# Patient Record
Sex: FEMALE | Race: WHITE | NOT HISPANIC OR LATINO | URBAN - METROPOLITAN AREA
[De-identification: names, ages, dates, MRNs, and addresses within clinical notes are randomized per-mention and may not be internally consistent; named-entity substitution may affect disease eponyms.]

---

## 2017-04-30 ENCOUNTER — EMERGENCY (EMERGENCY)
Facility: HOSPITAL | Age: 77
LOS: 1 days | Discharge: HOME | End: 2017-04-30
Attending: EMERGENCY MEDICINE | Admitting: EMERGENCY MEDICINE
Payer: MEDICARE

## 2017-04-30 VITALS
DIASTOLIC BLOOD PRESSURE: 83 MMHG | HEART RATE: 89 BPM | OXYGEN SATURATION: 96 % | RESPIRATION RATE: 18 BRPM | SYSTOLIC BLOOD PRESSURE: 150 MMHG | TEMPERATURE: 99 F

## 2017-04-30 VITALS
OXYGEN SATURATION: 96 % | DIASTOLIC BLOOD PRESSURE: 84 MMHG | HEART RATE: 77 BPM | RESPIRATION RATE: 18 BRPM | SYSTOLIC BLOOD PRESSURE: 140 MMHG | TEMPERATURE: 99 F

## 2017-04-30 DIAGNOSIS — Z79.1 LONG TERM (CURRENT) USE OF NON-STEROIDAL ANTI-INFLAMMATORIES (NSAID): ICD-10-CM

## 2017-04-30 DIAGNOSIS — Z88.6 ALLERGY STATUS TO ANALGESIC AGENT: ICD-10-CM

## 2017-04-30 DIAGNOSIS — Z88.5 ALLERGY STATUS TO NARCOTIC AGENT: ICD-10-CM

## 2017-04-30 DIAGNOSIS — M79.89 OTHER SPECIFIED SOFT TISSUE DISORDERS: ICD-10-CM

## 2017-04-30 DIAGNOSIS — Z88.0 ALLERGY STATUS TO PENICILLIN: ICD-10-CM

## 2017-04-30 DIAGNOSIS — Z79.01 LONG TERM (CURRENT) USE OF ANTICOAGULANTS: ICD-10-CM

## 2017-04-30 PROCEDURE — 93971 EXTREMITY STUDY: CPT

## 2017-04-30 PROCEDURE — 85730 THROMBOPLASTIN TIME PARTIAL: CPT

## 2017-04-30 PROCEDURE — 99284 EMERGENCY DEPT VISIT MOD MDM: CPT | Mod: 25

## 2017-04-30 PROCEDURE — 85610 PROTHROMBIN TIME: CPT

## 2017-04-30 PROCEDURE — 85025 COMPLETE CBC W/AUTO DIFF WBC: CPT

## 2017-04-30 PROCEDURE — 93971 EXTREMITY STUDY: CPT | Mod: 26,RT

## 2017-04-30 PROCEDURE — 80053 COMPREHEN METABOLIC PANEL: CPT

## 2017-04-30 PROCEDURE — 36415 COLL VENOUS BLD VENIPUNCTURE: CPT

## 2017-04-30 NOTE — ED ADULT TRIAGE NOTE - CHIEF COMPLAINT QUOTE
pt has Right lower extremity swelling for a while ( over a year ) but since Friday night swelling increased, extremity warm to touch , pedal pulses are present, flaky rash noted also on Right lower extremity

## 2017-04-30 NOTE — ED PROVIDER NOTE - MEDICAL DECISION MAKING DETAILS
pt with b/l leg swelling right > left x several days - similar hx in past, pe - +3 edema to right lower leg, no acute infection at this time, bedside us ? chronic dvt - sent for rads study and negative - recommend vascular follow up for ? venous insufficiency -

## 2017-04-30 NOTE — ED PROVIDER NOTE - OBJECTIVE STATEMENT
78 yo f presents to ED with right leg swelling.  Pt with hx of similar symptoms in past ? chronic dvt.  Pt currently not on anticoagulation.  Denies cp or shortness of breath.  No fever or chills.

## 2017-04-30 NOTE — ED PROVIDER NOTE - MUSCULOSKELETAL, MLM
right lower ext - +3 edema with erythema and chronic skin changes to leg, no distal nv deficit, no warmth, left lower ext - +2 edema

## 2017-04-30 NOTE — ED ADULT NURSE NOTE - OBJECTIVE STATEMENT
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 Pt verbalized that her leg swelling issues has been persistent for years but noticed the increased in size yesterday. Pt verbalized that when she was last evaluated, she was told that she may have a "blood-clot". Upon assessment, bilateral leg swelling noted with rash and redness noted to right lower leg, abdomen soft, lung fields WNL, breathing is equal and unlabored, pulses palpable. Care in progress. Awaiting disposition

## 2017-07-12 PROBLEM — Z00.00 ENCOUNTER FOR PREVENTIVE HEALTH EXAMINATION: Status: ACTIVE | Noted: 2017-07-12

## 2017-07-13 ENCOUNTER — APPOINTMENT (OUTPATIENT)
Dept: HEART AND VASCULAR | Facility: CLINIC | Age: 77
End: 2017-07-13
Payer: MEDICARE

## 2017-07-13 VITALS — HEART RATE: 86 BPM | DIASTOLIC BLOOD PRESSURE: 80 MMHG | SYSTOLIC BLOOD PRESSURE: 130 MMHG

## 2017-07-13 VITALS — WEIGHT: 130 LBS | HEART RATE: 86 BPM | BODY MASS INDEX: 21.66 KG/M2 | HEIGHT: 65 IN

## 2017-07-13 DIAGNOSIS — R06.02 SHORTNESS OF BREATH: ICD-10-CM

## 2017-07-13 DIAGNOSIS — R53.83 OTHER MALAISE: ICD-10-CM

## 2017-07-13 DIAGNOSIS — R53.81 OTHER MALAISE: ICD-10-CM

## 2017-07-13 DIAGNOSIS — M32.9 SYSTEMIC LUPUS ERYTHEMATOSUS, UNSPECIFIED: ICD-10-CM

## 2017-07-13 DIAGNOSIS — Z87.39 PERSONAL HISTORY OF OTHER DISEASES OF THE MUSCULOSKELETAL SYSTEM AND CONNECTIVE TISSUE: ICD-10-CM

## 2017-07-13 DIAGNOSIS — R00.2 PALPITATIONS: ICD-10-CM

## 2017-07-13 DIAGNOSIS — N82.3 FISTULA OF VAGINA TO LARGE INTESTINE: ICD-10-CM

## 2017-07-13 DIAGNOSIS — E11.9 TYPE 2 DIABETES MELLITUS W/OUT COMPLICATIONS: ICD-10-CM

## 2017-07-13 DIAGNOSIS — R42 DIZZINESS AND GIDDINESS: ICD-10-CM

## 2017-07-13 DIAGNOSIS — K44.9 DIAPHRAGMATIC HERNIA W/OUT OBSTRUCTION OR GANGRENE: ICD-10-CM

## 2017-07-13 DIAGNOSIS — M35.00 SYSTEMIC LUPUS ERYTHEMATOSUS, UNSPECIFIED: ICD-10-CM

## 2017-07-13 DIAGNOSIS — H33.319 HORSESHOE TEAR OF RETINA W/OUT DETACHMENT, UNSPECIFIED EYE: ICD-10-CM

## 2017-07-13 DIAGNOSIS — K22.70 BARRETT'S ESOPHAGUS W/OUT DYSPLASIA: ICD-10-CM

## 2017-07-13 PROCEDURE — 93000 ELECTROCARDIOGRAM COMPLETE: CPT

## 2017-07-13 PROCEDURE — 99204 OFFICE O/P NEW MOD 45 MIN: CPT | Mod: 25

## 2017-07-13 PROCEDURE — 93306 TTE W/DOPPLER COMPLETE: CPT

## 2017-07-13 PROCEDURE — 36415 COLL VENOUS BLD VENIPUNCTURE: CPT

## 2017-07-14 LAB
25(OH)D3 SERPL-MCNC: 49.9 NG/ML
ALBUMIN SERPL ELPH-MCNC: 4.4 G/DL
ALP BLD-CCNC: 74 U/L
ALT SERPL-CCNC: 13 U/L
ANION GAP SERPL CALC-SCNC: 17 MMOL/L
AST SERPL-CCNC: 14 U/L
BASOPHILS # BLD AUTO: 0.02 K/UL
BASOPHILS NFR BLD AUTO: 0.3 %
BILIRUB SERPL-MCNC: 0.8 MG/DL
BUN SERPL-MCNC: 28 MG/DL
CALCIUM SERPL-MCNC: 9.9 MG/DL
CHLORIDE SERPL-SCNC: 104 MMOL/L
CHOLEST SERPL-MCNC: 186 MG/DL
CHOLEST/HDLC SERPL: 3 RATIO
CO2 SERPL-SCNC: 23 MMOL/L
CREAT SERPL-MCNC: 1.06 MG/DL
EOSINOPHIL # BLD AUTO: 0.04 K/UL
EOSINOPHIL NFR BLD AUTO: 0.5 %
GLUCOSE SERPL-MCNC: 103 MG/DL
HBA1C MFR BLD HPLC: 5.6 %
HCT VFR BLD CALC: 44.1 %
HDLC SERPL-MCNC: 63 MG/DL
HGB BLD-MCNC: 13.9 G/DL
IMM GRANULOCYTES NFR BLD AUTO: 0.3 %
LDLC SERPL CALC-MCNC: 112 MG/DL
LYMPHOCYTES # BLD AUTO: 1.33 K/UL
LYMPHOCYTES NFR BLD AUTO: 17.5 %
MAN DIFF?: NORMAL
MCHC RBC-ENTMCNC: 29.7 PG
MCHC RBC-ENTMCNC: 31.5 GM/DL
MCV RBC AUTO: 94.2 FL
MONOCYTES # BLD AUTO: 0.47 K/UL
MONOCYTES NFR BLD AUTO: 6.2 %
NEUTROPHILS # BLD AUTO: 5.7 K/UL
NEUTROPHILS NFR BLD AUTO: 75.2 %
PLATELET # BLD AUTO: 219 K/UL
POTASSIUM SERPL-SCNC: 4.4 MMOL/L
PROT SERPL-MCNC: 7.8 G/DL
RBC # BLD: 4.68 M/UL
RBC # FLD: 14.2 %
SODIUM SERPL-SCNC: 144 MMOL/L
T3FREE SERPL-MCNC: 2.59 PG/ML
T4 FREE SERPL-MCNC: 1.4 NG/DL
TRIGL SERPL-MCNC: 55 MG/DL
TSH SERPL-ACNC: 0.65 UIU/ML
WBC # FLD AUTO: 7.58 K/UL

## 2017-07-17 ENCOUNTER — RESULT REVIEW (OUTPATIENT)
Age: 77
End: 2017-07-17

## 2017-07-24 LAB — MAGNESIUM RBC-MCNC: 5.7 MG/DL

## 2017-08-02 ENCOUNTER — APPOINTMENT (OUTPATIENT)
Dept: INTERNAL MEDICINE | Facility: CLINIC | Age: 77
End: 2017-08-02

## 2017-12-27 ENCOUNTER — EMERGENCY (EMERGENCY)
Facility: HOSPITAL | Age: 77
LOS: 1 days | Discharge: HOME | End: 2017-12-27
Attending: EMERGENCY MEDICINE | Admitting: EMERGENCY MEDICINE
Payer: MEDICARE

## 2017-12-27 VITALS
TEMPERATURE: 97 F | HEART RATE: 73 BPM | OXYGEN SATURATION: 94 % | RESPIRATION RATE: 18 BRPM | SYSTOLIC BLOOD PRESSURE: 144 MMHG | DIASTOLIC BLOOD PRESSURE: 83 MMHG

## 2017-12-27 VITALS
SYSTOLIC BLOOD PRESSURE: 150 MMHG | OXYGEN SATURATION: 95 % | RESPIRATION RATE: 18 BRPM | DIASTOLIC BLOOD PRESSURE: 77 MMHG | HEART RATE: 87 BPM | TEMPERATURE: 98 F

## 2017-12-27 DIAGNOSIS — Z88.0 ALLERGY STATUS TO PENICILLIN: ICD-10-CM

## 2017-12-27 DIAGNOSIS — Z79.899 OTHER LONG TERM (CURRENT) DRUG THERAPY: ICD-10-CM

## 2017-12-27 DIAGNOSIS — Z88.8 ALLERGY STATUS TO OTHER DRUGS, MEDICAMENTS AND BIOLOGICAL SUBSTANCES STATUS: ICD-10-CM

## 2017-12-27 DIAGNOSIS — R09.81 NASAL CONGESTION: ICD-10-CM

## 2017-12-27 DIAGNOSIS — Z88.5 ALLERGY STATUS TO NARCOTIC AGENT: ICD-10-CM

## 2017-12-27 DIAGNOSIS — R06.02 SHORTNESS OF BREATH: ICD-10-CM

## 2017-12-27 DIAGNOSIS — F41.9 ANXIETY DISORDER, UNSPECIFIED: ICD-10-CM

## 2017-12-27 PROCEDURE — 99283 EMERGENCY DEPT VISIT LOW MDM: CPT

## 2017-12-27 RX ORDER — BENZTROPINE MESYLATE 1 MG
0 TABLET ORAL
Qty: 0 | Refills: 0 | COMMUNITY

## 2017-12-27 RX ORDER — OXYMETAZOLINE HYDROCHLORIDE 0.5 MG/ML
2 SPRAY NASAL
Qty: 1 | Refills: 0 | OUTPATIENT
Start: 2017-12-27 | End: 2017-12-29

## 2017-12-27 RX ORDER — FAMOTIDINE 10 MG/ML
0 INJECTION INTRAVENOUS
Qty: 0 | Refills: 0 | COMMUNITY

## 2017-12-27 RX ORDER — MIRTAZAPINE 45 MG/1
0 TABLET, ORALLY DISINTEGRATING ORAL
Qty: 0 | Refills: 0 | COMMUNITY

## 2017-12-27 RX ORDER — DOCUSATE SODIUM 100 MG
0 CAPSULE ORAL
Qty: 0 | Refills: 0 | COMMUNITY

## 2017-12-27 NOTE — ED ADULT NURSE NOTE - CHIEF COMPLAINT QUOTE
I am having difficulty breathing.  At first is was my nose, but now it's also my mouth. ( Son states has been aditted for behavioral health at [facility] for several months and that she has fequent complaints of medical problems ranging from inability to walk to inability to breath and every time it is negative.  He believes this to be another episode of similar behavior)>

## 2017-12-27 NOTE — ED ADULT TRIAGE NOTE - CHIEF COMPLAINT QUOTE
I am having difficulty breathing.  At first is was my nose, but now it's also my mouth. ( Son states has been aditted for behavioral health at [facility] for several months and that she has fequent complaints of medical problems ranging from inability to walk to inability to breath and every time it is negative.  He believes this to be another episode of similar behavior)>

## 2017-12-27 NOTE — ED ADULT NURSE NOTE - OBJECTIVE STATEMENT
Pt presents with c/o nasal congestion, dry mouth; denied CP, SOB; son at bedside stated that pt has had multiple previous presentations of similar nature, where mostly complaints were found to be of psychiatric nature. Otherwise pt appears without distress stating that her nose is 'stuffed up' and that she is unable to breath through it.

## 2017-12-27 NOTE — ED PROVIDER NOTE - OBJECTIVE STATEMENT
78yo F hx of severe anxiety worked up in [place] ed many times, and outpatient doctors and specialists (hx as per son), known to overuse ED, here today w/ concern for nasal congestion and resultant sensation of shortness of breath. Pt states cannot breathe through her nose, must breathe through mouth, and feels like she cannot get a good breath as a result. called her son and asked him to take her to the ED. Denies fevers/chills, CP, YOUNG, trouble ambulating. Speaking in full sentences without issue. Son denies any acute worsening of her anxiety - this happens every few days, sees a psychiatrist as well, but does not relax unless assessed by MD. Pt denies any other symptoms.

## 2017-12-27 NOTE — ED PROVIDER NOTE - MEDICAL DECISION MAKING DETAILS
here w/ concern for SOB 2/2 nasal congestion. here w/ concern for SOB 2/2 nasal congestion. no other symptoms. plan for nasal saline and irrigation. DC home in NAD with strict return precautions given with son.

## 2018-10-05 ENCOUNTER — HOSPITAL ENCOUNTER (EMERGENCY)
Dept: HOSPITAL 74 - JER | Age: 78
LOS: 1 days | Discharge: HOME | End: 2018-10-06
Payer: COMMERCIAL

## 2018-10-05 VITALS — BODY MASS INDEX: 23.9 KG/M2

## 2018-10-05 DIAGNOSIS — Z91.14: ICD-10-CM

## 2018-10-05 DIAGNOSIS — I87.8: ICD-10-CM

## 2018-10-05 DIAGNOSIS — L97.811: ICD-10-CM

## 2018-10-05 DIAGNOSIS — K22.70: ICD-10-CM

## 2018-10-05 DIAGNOSIS — R42: Primary | ICD-10-CM

## 2018-10-05 DIAGNOSIS — K44.9: ICD-10-CM

## 2018-10-05 DIAGNOSIS — G20: ICD-10-CM

## 2018-10-05 DIAGNOSIS — K21.9: ICD-10-CM

## 2018-10-05 DIAGNOSIS — L97.821: ICD-10-CM

## 2018-10-05 LAB
ALBUMIN SERPL-MCNC: 3.5 G/DL (ref 3.4–5)
ALP SERPL-CCNC: 111 U/L (ref 45–117)
ALT SERPL-CCNC: 20 U/L (ref 13–61)
ANION GAP SERPL CALC-SCNC: 6 MMOL/L (ref 8–16)
AST SERPL-CCNC: 17 U/L (ref 15–37)
BASOPHILS # BLD: 0.6 % (ref 0–2)
BILIRUB SERPL-MCNC: 0.5 MG/DL (ref 0.2–1)
BUN SERPL-MCNC: 33 MG/DL (ref 7–18)
CALCIUM SERPL-MCNC: 9.1 MG/DL (ref 8.5–10.1)
CHLORIDE SERPL-SCNC: 109 MMOL/L (ref 98–107)
CO2 SERPL-SCNC: 28 MMOL/L (ref 21–32)
CREAT SERPL-MCNC: 1.1 MG/DL (ref 0.55–1.3)
DEPRECATED RDW RBC AUTO: 13.7 % (ref 11.6–15.6)
EOSINOPHIL # BLD: 3 % (ref 0–4.5)
GLUCOSE SERPL-MCNC: 97 MG/DL (ref 74–106)
HCT VFR BLD CALC: 42.4 % (ref 32.4–45.2)
HGB BLD-MCNC: 14 GM/DL (ref 10.7–15.3)
LYMPHOCYTES # BLD: 26.3 % (ref 8–40)
MCH RBC QN AUTO: 30 PG (ref 25.7–33.7)
MCHC RBC AUTO-ENTMCNC: 33.1 G/DL (ref 32–36)
MCV RBC: 90.6 FL (ref 80–96)
MONOCYTES # BLD AUTO: 8.7 % (ref 3.8–10.2)
NEUTROPHILS # BLD: 61.4 % (ref 42.8–82.8)
PLATELET # BLD AUTO: 227 K/MM3 (ref 134–434)
PMV BLD: 9.1 FL (ref 7.5–11.1)
POTASSIUM SERPLBLD-SCNC: 4.8 MMOL/L (ref 3.5–5.1)
PROT SERPL-MCNC: 7.6 G/DL (ref 6.4–8.2)
RBC # BLD AUTO: 4.67 M/MM3 (ref 3.6–5.2)
SODIUM SERPL-SCNC: 144 MMOL/L (ref 136–145)
WBC # BLD AUTO: 9.4 K/MM3 (ref 4–10)

## 2018-10-05 NOTE — PDOC
Attending Attestation





- HPI


HPI: 





10/05/18 21:07


The patient is a 78-year-old with past medical history significant for COPD, 

GERD, and Nunes's esophagus and hiatal hernia presents to the emergency 

department with vertigo. The patient denies any acute complaint. The patient 

reports having episodes of vertigo reports bring non-compliant with her 

Antivert medication today. The patient states she is nonambulatory secondary to 

vertigo. Denies hx of a blood clot or the use of blood thinners. Denies chest 

pain. Patient endorses abdominal pain, reports hx of Nunes's and hiatal 

hernia.


Allergies: NKA


PCP: None





- Physicial Exam


PE: 





10/05/18 23:49


PE: The patient is afebrile, answering all questions, alert and oriented. 


Extremity: B/l lower leg warmth with redness. 








- Medical Decision Making





10/05/18 21:07





Documentation prepared by Bina Valencia, acting as medical scribe for Tabitha Hollins MD. 





<Bina Valencia - Last Filed: 10/05/18 23:49>





- Resident


Resident Name: Estevan Terrell





- ED Attending Attestation


I have performed the following: I have examined & evaluated the patient, The 

case was reviewed & discussed with the resident, I agree w/resident's findings 

& plan





- HPI


HPI: 








10/06/18 06:01


Pt comes with multiple complaints.  Her exam is normal however, and she is 

walking about the ER socializing with all the patients.





- Physicial Exam


PE: 








10/06/18 06:01


Agree with resident exam.


Pt has bilateral stasis changes on her lower legs. 


Pt is unkempt; she is afraid to bathe because she has chronic skin changes and 

she doesn't want to have her skin break down further.





- Medical Decision Making








10/06/18 06:02


Labs normal; exam normal and pt will be sent home. However, she states that she 

doesn't have the outer apartment key to the building. States that she needs to 

go home at 9AM.





<Tabitha Hollins - Last Filed: 10/06/18 06:05>

## 2018-10-06 VITALS — DIASTOLIC BLOOD PRESSURE: 67 MMHG | TEMPERATURE: 99 F | SYSTOLIC BLOOD PRESSURE: 126 MMHG | HEART RATE: 69 BPM

## 2018-10-06 NOTE — PDOC
History of Present Illness





- General


Chief Complaint: Lightheaded


Stated Complaint: SICK


Time Seen by Provider: 10/05/18 19:06


History Source: Patient


Exam Limitations: No Limitations





- History of Present Illness


Initial Comments: 





77 y/o female presenting to [facility] ER via ambulance complaining of worsening of 

her usual vertigo symptoms and pain on the medial aspect of the right lower 

extremity. Pt states her vertigo is worse because she did not take her antivert 

medication today because she didnt feel like it at the time. She further 

states that her leg pain is chronic and unchanged. Pt was instructed to present 

here by her . She states that her complaints are all chronic and 

she does not think she requires emergency evaluation. 





Pt denies personal or familial history of blood clots.  








PCP: [facility]





Medical Hx: 


- Vertigo


- Parkinsons


- Nunes's Esophagus, EGD reportedly last year


- COPD











Past History





- Past Medical History


Allergies/Adverse Reactions: 


 Allergies











Allergy/AdvReac Type Severity Reaction Status Date / Time


 


No Known Allergies Allergy   Unverified 10/05/18 17:29











Home Medications: 


Ambulatory Orders





Aspirin Coated [Ecotrin -] 81 mg PO DAILY #30 tablet.ec 07/11/18 


Carbidopa/Levodopa 10/100 [Sinemet 10/100 -] 1 each PO BID #60 tablet 07/11/18 


Meclizine HCl [Antivert -] 25 mg PO TID PRN #90 tablet 07/11/18 








Cancer: Yes (Basal cell carcinoma left ear)


COPD: Yes





- Surgical History


Cholecystectomy: Yes





- Suicide/Smoking/Psychosocial Hx


Smoking History: Never smoked


Hx Alcohol Use: No


Drug/Substance Use Hx: No


Substance Use Type: None





**Review of Systems





- Review of Systems


Able to Perform ROS?: Yes


Comments:: 





In addition to that documented in the HPI above, the additional ROS was obtained

:


Constitutional: Denies fevers or chills


Eyes: Denies vision changes


ENMT: Denies sore throat


CV: Denies chest pain


Resp: Denies SOB


GI: Denies vomiting or diarrhea











*Physical Exam





- Vital Signs


 Last Vital Signs











Temp Pulse Resp BP Pulse Ox


 


 98.4 F   82   18   104/71   96 


 


 10/05/18 17:30  10/05/18 17:30  10/05/18 17:30  10/05/18 17:30  10/05/18 17:30














- Physical Exam


Comments: 





Constitutional: Well-developed, well-nourished female in no acute distress or 

obvious discomfort. Found left lateral recumbent position in hospital bed. 

Alert and oriented x4. Answered all questions appropriately and completely. 

Speech was non-labored, non-pressured.    





HEENT: Normocephalic. No obvious external signs of trauma. Pupils 3mm and PERRL 

bilaterally. Hearing grossly normal. No nasal discharge. Neck is supple, 

trachea is midline.   


  


Cardiovascular: Regular rate and regular rhythm.  No murmur, rubs, clicks, or 

gallops. Peripheral pulses:  Radial pulses full.   


 


Respiratory: Breathing unlabored. Equal chest rise and fall. Clear to 

auscultation bilaterally. No stridor, no wheezing, no rhonchi. 


 


Gastrointestinal: abdomen is soft, non-tender, non-distended. 


 


Neuro: Alert and oriented. Moving all four extremities spontaneously. Gait 

normal, observed walking throughout the department unassisted. 


  


MSK / Skin: Warm and dry. Moderate tinea corporis on right and left feet 

without bleeding or skin breakdown. No subjective tenderness on palpation of 

medial aspect of right lower extremity. 


 


Psych: Affect: appropriate.  Mood: normal.











ED Treatment Course





- LABORATORY


CBC & Chemistry Diagram: 


 10/05/18 21:39





 10/05/18 21:39





- ADDITIONAL ORDERS


Additional order review: 


 Laboratory  Results











  10/05/18 10/05/18





  21:39 21:39


 


Sodium   144


 


Potassium   4.8


 


Chloride   109 H


 


Carbon Dioxide   28


 


Anion Gap   6 L


 


BUN   33 H


 


Creatinine   1.1


 


Creat Clearance w eGFR   47.91


 


Random Glucose   97


 


Calcium   9.1


 


Total Bilirubin   0.5


 


AST   17


 


ALT   20


 


Alkaline Phosphatase   111


 


Troponin I  < 0.02 


 


Total Protein   7.6


 


Albumin   3.5








 











  10/05/18





  21:39


 


RBC  4.67


 


MCV  90.6


 


MCHC  33.1


 


RDW  13.7


 


MPV  9.1


 


Neutrophils %  61.4


 


Lymphocytes %  26.3


 


Monocytes %  8.7


 


Eosinophils %  3.0


 


Basophils %  0.6














- RADIOLOGY


Radiology Studies Ordered: 














 Category Date Time Status


 


 DUPLEX ART. LEGS- LIMITED US [US] Stat Ultrasound  10/05/18 19:59 Completed


 


 DUPLEX VASCUL US-1 LEG [US] Stat Ultrasound  10/05/18 19:59 Completed














- Medications


Given in the ED: 


ED Medications














Discontinued Medications














Generic Name Dose Route Start Last Admin





  Trade Name Freq  PRN Reason Stop Dose Admin


 


Meclizine HCl  50 mg  10/05/18 19:40  10/05/18 21:00





  Antivert -  PO  10/05/18 19:41  50 mg





  ONCE ONE   Administration





     





     





     





     














Medical Decision Making





- Medical Decision Making





*Reviewed vital signs, nursing notes, and prior visit documentation (if 

available).





77 y/o female presenting with vertiginous symptoms after failing to take 

prescribed meclizine. Afebrile. Vitals unremarkable. Will obtain duplex 

arterial and venous u/s of right lower extremity to evaluate for thrombus. 

Suspect vertiginous symptoms are related to poor medication compliance. Will 

obtain CBC, CMP, troponin, and EKG. D/D: ACS, anemia, electrolyte abnormality. 

Ordered meclizine for symptom relief. 





CBC unremarkable for anemia or leukocytosis.





CMP unremarkable for electrolyte derangement. LFTs not elevated. 





Troponin not elevated. 





U/S do not show evidence of DVT. Prominently diminished flow at level of right 

posterior tibial artery. 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 Pt will 

need to have further outpatient workup. 





Discussed imaging and laboratory results with pt. Answered all questions. 

Provided return precautions. Pt expressed verbal understanding and agreement 

with plan to discharge home with outpatient follow up.





Pt unable to return to her apartment building [facility] because she forgot the 

electronic FOB required to enter the front door. Called [facility] for building who 

provided emergency contact numbers. Unable to make contact with building 

management at either number. Pts return ambulance rescheduled for 9AM when her 

building  service opens. 














*DC/Admit/Observation/Transfer


Diagnosis at time of Disposition: 


 Lightheadedness








- Discharge Dispostion


Disposition: HOME


Condition at time of disposition: Stable


Decision to Admit order: No





- Referrals


Referrals: 


[facility] [place] [Provider Group]





- Patient Instructions


Printed Discharge Instructions:  DI for Vertigo


Additional Instructions: 


your vertigo was likey caused by not taking your antivert. Please remember to 

take your antivert when you feel dizzy or lightheaded. 


Come back to the ER if you experience any new dizziness, lightheadedness, a 

high fever, vomiting, or any other new or worsening concerns. 





Thank you for coming to the [facility] ER. We hope you feel better soon! 


Print Language: ENGLISH





- Post Discharge Activity

## 2018-10-06 NOTE — EKG
Test Reason : 

Blood Pressure : ***/*** mmHG

Vent. Rate : 069 BPM     Atrial Rate : 069 BPM

   P-R Int : 162 ms          QRS Dur : 070 ms

    QT Int : 390 ms       P-R-T Axes : 048 013 050 degrees

   QTc Int : 417 ms

 

NORMAL SINUS RHYTHM

NORMAL ECG

WHEN COMPARED WITH ECG OF 06-JUL-2018 17:50,

NO SIGNIFICANT CHANGE WAS FOUND

Confirmed by ADRIEN CACERES MD ([id number]) on 10/6/2018 4:55:05 PM

 

Referred By:             Confirmed By:ADRIEN CACERES MD

## 2022-03-13 ENCOUNTER — HOSPITAL ENCOUNTER (EMERGENCY)
Dept: HOSPITAL 74 - JER | Age: 82
Discharge: HOME | End: 2022-03-13
Payer: COMMERCIAL

## 2022-03-13 VITALS — DIASTOLIC BLOOD PRESSURE: 68 MMHG | HEART RATE: 90 BPM | SYSTOLIC BLOOD PRESSURE: 119 MMHG | TEMPERATURE: 97.7 F

## 2022-03-13 VITALS — BODY MASS INDEX: 22.8 KG/M2

## 2022-03-13 DIAGNOSIS — L03.116: ICD-10-CM

## 2022-03-13 DIAGNOSIS — M25.562: Primary | ICD-10-CM

## 2022-03-13 DIAGNOSIS — N39.0: ICD-10-CM

## 2022-03-13 DIAGNOSIS — M79.641: ICD-10-CM

## 2022-03-13 LAB
ALBUMIN SERPL-MCNC: 3.2 G/DL (ref 3.4–5)
ALP SERPL-CCNC: 79 U/L (ref 45–117)
ALT SERPL-CCNC: 20 U/L (ref 13–61)
ANION GAP SERPL CALC-SCNC: 5 MMOL/L (ref 8–16)
APPEARANCE UR: CLEAR
AST SERPL-CCNC: 20 U/L (ref 15–37)
BACTERIA # UR AUTO: (no result) /UL (ref 0–1359)
BASOPHILS # BLD: 0.3 % (ref 0–2)
BILIRUB SERPL-MCNC: 0.4 MG/DL (ref 0.2–1)
BILIRUB UR STRIP.AUTO-MCNC: NEGATIVE MG/DL
BNP SERPL-MCNC: 118.4 PG/ML (ref 5–450)
BUN SERPL-MCNC: 28.9 MG/DL (ref 7–18)
CALCIUM SERPL-MCNC: 8.7 MG/DL (ref 8.5–10.1)
CASTS URNS QL MICRO: 0 /UL (ref 0–3.1)
CHLORIDE SERPL-SCNC: 109 MMOL/L (ref 98–107)
CO2 SERPL-SCNC: 31 MMOL/L (ref 21–32)
COLOR UR: YELLOW
CREAT SERPL-MCNC: 0.9 MG/DL (ref 0.55–1.3)
DEPRECATED RDW RBC AUTO: 13.9 % (ref 11.6–15.6)
EOSINOPHIL # BLD: 2.3 % (ref 0–4.5)
EPITH CASTS URNS QL MICRO: >36 /UL (ref 0–25.1)
GLUCOSE SERPL-MCNC: 86 MG/DL (ref 74–106)
HCT VFR BLD CALC: 37.1 % (ref 32.4–45.2)
HGB BLD-MCNC: 12.4 GM/DL (ref 10.7–15.3)
KETONES UR QL STRIP: (no result)
LEUKOCYTE ESTERASE UR QL STRIP.AUTO: (no result)
LYMPHOCYTES # BLD: 15.1 % (ref 8–40)
MAGNESIUM SERPL-MCNC: 2.2 MG/DL (ref 1.8–2.4)
MCH RBC QN AUTO: 30 PG (ref 25.7–33.7)
MCHC RBC AUTO-ENTMCNC: 33.4 G/DL (ref 32–36)
MCV RBC: 89.9 FL (ref 80–96)
MONOCYTES # BLD AUTO: 7.6 % (ref 3.8–10.2)
NEUTROPHILS # BLD: 74.7 % (ref 42.8–82.8)
NITRITE UR QL STRIP: POSITIVE
PH UR: 6 [PH] (ref 5–8)
PLATELET # BLD AUTO: 212 10^3/UL (ref 134–434)
PMV BLD: 7.7 FL (ref 7.5–11.1)
PROT SERPL-MCNC: 6.4 G/DL (ref 6.4–8.2)
PROT UR QL STRIP: NEGATIVE
PROT UR QL STRIP: NEGATIVE
RBC # BLD AUTO: 4.13 M/MM3 (ref 3.6–5.2)
RBC # BLD AUTO: 7 /UL (ref 0–23.9)
SODIUM SERPL-SCNC: 145 MMOL/L (ref 136–145)
SP GR UR: 1.02 (ref 1.01–1.03)
UROBILINOGEN UR STRIP-MCNC: 0.2 MG/DL (ref 0.2–1)
WBC # BLD AUTO: 7.3 K/MM3 (ref 4–10)
WBC # UR AUTO: 98 /UL (ref 0–25.8)

## 2022-03-13 PROCEDURE — 3E03329 INTRODUCTION OF OTHER ANTI-INFECTIVE INTO PERIPHERAL VEIN, PERCUTANEOUS APPROACH: ICD-10-PCS

## 2022-03-13 PROCEDURE — 3E0333Z INTRODUCTION OF ANTI-INFLAMMATORY INTO PERIPHERAL VEIN, PERCUTANEOUS APPROACH: ICD-10-PCS

## 2022-09-02 ENCOUNTER — HOSPITAL ENCOUNTER (INPATIENT)
Dept: HOSPITAL 74 - JER | Age: 82
LOS: 9 days | Discharge: HOME | DRG: 603 | End: 2022-09-11
Attending: INTERNAL MEDICINE | Admitting: INTERNAL MEDICINE
Payer: COMMERCIAL

## 2022-09-02 VITALS — BODY MASS INDEX: 23.2 KG/M2

## 2022-09-02 DIAGNOSIS — I25.10: ICD-10-CM

## 2022-09-02 DIAGNOSIS — L03.116: ICD-10-CM

## 2022-09-02 DIAGNOSIS — R07.89: ICD-10-CM

## 2022-09-02 DIAGNOSIS — I10: ICD-10-CM

## 2022-09-02 DIAGNOSIS — G20: ICD-10-CM

## 2022-09-02 DIAGNOSIS — I73.9: ICD-10-CM

## 2022-09-02 DIAGNOSIS — H40.9: ICD-10-CM

## 2022-09-02 DIAGNOSIS — K22.70: ICD-10-CM

## 2022-09-02 DIAGNOSIS — N39.0: ICD-10-CM

## 2022-09-02 DIAGNOSIS — L03.115: Primary | ICD-10-CM

## 2022-09-02 DIAGNOSIS — J44.9: ICD-10-CM

## 2022-09-02 DIAGNOSIS — R09.02: ICD-10-CM

## 2022-09-02 LAB
ALBUMIN SERPL-MCNC: 3.4 G/DL (ref 3.4–5)
ALP SERPL-CCNC: 73 U/L (ref 45–117)
ALT SERPL-CCNC: 16 U/L (ref 13–61)
ANION GAP SERPL CALC-SCNC: 6 MMOL/L (ref 8–16)
APPEARANCE UR: (no result)
AST SERPL-CCNC: 13 U/L (ref 15–37)
BACTERIA # UR AUTO: 3533 /UL (ref 0–1359)
BASOPHILS # BLD: 0.5 % (ref 0–2)
BILIRUB SERPL-MCNC: 1.2 MG/DL (ref 0.2–1)
BILIRUB UR STRIP.AUTO-MCNC: NEGATIVE MG/DL
BUN SERPL-MCNC: 23.6 MG/DL (ref 7–18)
CALCIUM SERPL-MCNC: 8.7 MG/DL (ref 8.5–10.1)
CASTS URNS QL MICRO: 1 /UL (ref 0–3.1)
CHLORIDE SERPL-SCNC: 106 MMOL/L (ref 98–107)
CO2 SERPL-SCNC: 28 MMOL/L (ref 21–32)
COLOR UR: YELLOW
CREAT SERPL-MCNC: 0.9 MG/DL (ref 0.55–1.3)
DEPRECATED RDW RBC AUTO: 14.4 % (ref 11.6–15.6)
EOSINOPHIL # BLD: 2.8 % (ref 0–4.5)
EPITH CASTS URNS QL MICRO: >36 /UL (ref 0–25.1)
GLUCOSE SERPL-MCNC: 100 MG/DL (ref 74–106)
HCT VFR BLD CALC: 41.1 % (ref 32.4–45.2)
HGB BLD-MCNC: 13.7 GM/DL (ref 10.7–15.3)
INR BLD: 1.07 (ref 0.83–1.09)
KETONES UR QL STRIP: (no result)
LEUKOCYTE ESTERASE UR QL STRIP.AUTO: (no result)
LYMPHOCYTES # BLD: 26.7 % (ref 8–40)
MCH RBC QN AUTO: 29.5 PG (ref 25.7–33.7)
MCHC RBC AUTO-ENTMCNC: 33.3 G/DL (ref 32–36)
MCV RBC: 88.6 FL (ref 80–96)
MONOCYTES # BLD AUTO: 8 % (ref 3.8–10.2)
NEUTROPHILS # BLD: 62 % (ref 42.8–82.8)
NITRITE UR QL STRIP: POSITIVE
PH UR: 6.5 [PH] (ref 5–8)
PLATELET # BLD AUTO: 220 10^3/UL (ref 134–434)
PMV BLD: 7.3 FL (ref 7.5–11.1)
PROT SERPL-MCNC: 7.2 G/DL (ref 6.4–8.2)
PROT UR QL STRIP: NEGATIVE
PROT UR QL STRIP: NEGATIVE
PT PNL PPP: 12.3 SEC (ref 9.7–13)
RBC # BLD AUTO: 17.8 /UL (ref 0–23.9)
RBC # BLD AUTO: 4.64 M/MM3 (ref 3.6–5.2)
SODIUM SERPL-SCNC: 140 MMOL/L (ref 136–145)
SP GR UR: 1.02 (ref 1.01–1.03)
UROBILINOGEN UR STRIP-MCNC: 0.2 MG/DL (ref 0.2–1)
WBC # BLD AUTO: 6.8 K/MM3 (ref 4–10)
WBC # UR AUTO: 57 /UL (ref 0–25.8)

## 2022-09-02 PROCEDURE — A9579 GAD-BASE MR CONTRAST NOS,1ML: HCPCS

## 2022-09-03 LAB
ALBUMIN SERPL-MCNC: 3.2 G/DL (ref 3.4–5)
ALP SERPL-CCNC: 73 U/L (ref 45–117)
ALT SERPL-CCNC: 16 U/L (ref 13–61)
ANION GAP SERPL CALC-SCNC: 5 MMOL/L (ref 8–16)
AST SERPL-CCNC: 20 U/L (ref 15–37)
BASOPHILS # BLD: 0.6 % (ref 0–2)
BILIRUB SERPL-MCNC: 1.6 MG/DL (ref 0.2–1)
BUN SERPL-MCNC: 19.6 MG/DL (ref 7–18)
CALCIUM SERPL-MCNC: 8.7 MG/DL (ref 8.5–10.1)
CHLORIDE SERPL-SCNC: 110 MMOL/L (ref 98–107)
CO2 SERPL-SCNC: 28 MMOL/L (ref 21–32)
CREAT SERPL-MCNC: 1 MG/DL (ref 0.55–1.3)
DEPRECATED RDW RBC AUTO: 14.2 % (ref 11.6–15.6)
EOSINOPHIL # BLD: 2.8 % (ref 0–4.5)
GLUCOSE SERPL-MCNC: 114 MG/DL (ref 74–106)
HCT VFR BLD CALC: 40.4 % (ref 32.4–45.2)
HGB BLD-MCNC: 13.5 GM/DL (ref 10.7–15.3)
LYMPHOCYTES # BLD: 18.5 % (ref 8–40)
MAGNESIUM SERPL-MCNC: 2.2 MG/DL (ref 1.8–2.4)
MCH RBC QN AUTO: 29.3 PG (ref 25.7–33.7)
MCHC RBC AUTO-ENTMCNC: 33.4 G/DL (ref 32–36)
MCV RBC: 87.8 FL (ref 80–96)
MONOCYTES # BLD AUTO: 8.4 % (ref 3.8–10.2)
NEUTROPHILS # BLD: 69.7 % (ref 42.8–82.8)
PHOSPHATE SERPL-MCNC: 2.9 MG/DL (ref 2.5–4.9)
PLATELET # BLD AUTO: 228 10^3/UL (ref 134–434)
PMV BLD: 7.8 FL (ref 7.5–11.1)
PROT SERPL-MCNC: 6.6 G/DL (ref 6.4–8.2)
RBC # BLD AUTO: 4.61 M/MM3 (ref 3.6–5.2)
SODIUM SERPL-SCNC: 144 MMOL/L (ref 136–145)
WBC # BLD AUTO: 6.9 K/MM3 (ref 4–10)

## 2022-09-03 RX ADMIN — ENOXAPARIN SODIUM SCH MG: 40 INJECTION SUBCUTANEOUS at 09:37

## 2022-09-03 RX ADMIN — SODIUM CHLORIDE SCH MLS/HR: 9 INJECTION, SOLUTION INTRAVENOUS at 21:14

## 2022-09-03 RX ADMIN — PIPERACILLIN SODIUM,TAZOBACTAM SODIUM SCH MLS/HR: 3; .375 INJECTION, POWDER, FOR SOLUTION INTRAVENOUS at 00:43

## 2022-09-03 RX ADMIN — CEFTRIAXONE SCH MLS/HR: 1 INJECTION, POWDER, FOR SOLUTION INTRAMUSCULAR; INTRAVENOUS at 11:32

## 2022-09-03 RX ADMIN — PIPERACILLIN SODIUM,TAZOBACTAM SODIUM SCH MLS/HR: 3; .375 INJECTION, POWDER, FOR SOLUTION INTRAVENOUS at 06:24

## 2022-09-03 RX ADMIN — SODIUM CHLORIDE SCH MLS/HR: 9 INJECTION, SOLUTION INTRAVENOUS at 11:00

## 2022-09-04 LAB
ALBUMIN SERPL-MCNC: 2.9 G/DL (ref 3.4–5)
ALP SERPL-CCNC: 65 U/L (ref 45–117)
ALT SERPL-CCNC: 14 U/L (ref 13–61)
ANION GAP SERPL CALC-SCNC: 8 MMOL/L (ref 8–16)
AST SERPL-CCNC: 13 U/L (ref 15–37)
BASOPHILS # BLD: 0.5 % (ref 0–2)
BILIRUB SERPL-MCNC: 0.7 MG/DL (ref 0.2–1)
BUN SERPL-MCNC: 14.6 MG/DL (ref 7–18)
CALCIUM SERPL-MCNC: 8.3 MG/DL (ref 8.5–10.1)
CHLORIDE SERPL-SCNC: 111 MMOL/L (ref 98–107)
CO2 SERPL-SCNC: 26 MMOL/L (ref 21–32)
CREAT SERPL-MCNC: 0.8 MG/DL (ref 0.55–1.3)
DEPRECATED RDW RBC AUTO: 14.2 % (ref 11.6–15.6)
EOSINOPHIL # BLD: 2.1 % (ref 0–4.5)
GLUCOSE SERPL-MCNC: 101 MG/DL (ref 74–106)
HCT VFR BLD CALC: 38.3 % (ref 32.4–45.2)
HGB BLD-MCNC: 12.6 GM/DL (ref 10.7–15.3)
LYMPHOCYTES # BLD: 17.4 % (ref 8–40)
MCH RBC QN AUTO: 29 PG (ref 25.7–33.7)
MCHC RBC AUTO-ENTMCNC: 32.9 G/DL (ref 32–36)
MCV RBC: 88.2 FL (ref 80–96)
MONOCYTES # BLD AUTO: 7.9 % (ref 3.8–10.2)
NEUTROPHILS # BLD: 72.1 % (ref 42.8–82.8)
PLATELET # BLD AUTO: 218 10^3/UL (ref 134–434)
PMV BLD: 7.9 FL (ref 7.5–11.1)
PROT SERPL-MCNC: 6.2 G/DL (ref 6.4–8.2)
RBC # BLD AUTO: 4.35 M/MM3 (ref 3.6–5.2)
SODIUM SERPL-SCNC: 144 MMOL/L (ref 136–145)
WBC # BLD AUTO: 6.8 K/MM3 (ref 4–10)

## 2022-09-04 RX ADMIN — CEFTRIAXONE SCH MLS/HR: 1 INJECTION, POWDER, FOR SOLUTION INTRAMUSCULAR; INTRAVENOUS at 10:15

## 2022-09-04 RX ADMIN — ENOXAPARIN SODIUM SCH MG: 40 INJECTION SUBCUTANEOUS at 10:15

## 2022-09-05 LAB
ALBUMIN SERPL-MCNC: 3 G/DL (ref 3.4–5)
ALP SERPL-CCNC: 63 U/L (ref 45–117)
ALT SERPL-CCNC: 14 U/L (ref 13–61)
ANION GAP SERPL CALC-SCNC: 8 MMOL/L (ref 8–16)
AST SERPL-CCNC: 16 U/L (ref 15–37)
BASOPHILS # BLD: 0.5 % (ref 0–2)
BILIRUB SERPL-MCNC: 0.8 MG/DL (ref 0.2–1)
BUN SERPL-MCNC: 16.1 MG/DL (ref 7–18)
CALCIUM SERPL-MCNC: 9.1 MG/DL (ref 8.5–10.1)
CHLORIDE SERPL-SCNC: 112 MMOL/L (ref 98–107)
CO2 SERPL-SCNC: 25 MMOL/L (ref 21–32)
CREAT SERPL-MCNC: 0.8 MG/DL (ref 0.55–1.3)
DEPRECATED RDW RBC AUTO: 14.5 % (ref 11.6–15.6)
EOSINOPHIL # BLD: 3.6 % (ref 0–4.5)
GLUCOSE SERPL-MCNC: 91 MG/DL (ref 74–106)
HCT VFR BLD CALC: 37.8 % (ref 32.4–45.2)
HGB BLD-MCNC: 12.3 GM/DL (ref 10.7–15.3)
LYMPHOCYTES # BLD: 25 % (ref 8–40)
MCH RBC QN AUTO: 28.9 PG (ref 25.7–33.7)
MCHC RBC AUTO-ENTMCNC: 32.6 G/DL (ref 32–36)
MCV RBC: 88.6 FL (ref 80–96)
MONOCYTES # BLD AUTO: 9.3 % (ref 3.8–10.2)
NEUTROPHILS # BLD: 61.6 % (ref 42.8–82.8)
PLATELET # BLD AUTO: 207 10^3/UL (ref 134–434)
PMV BLD: 7.9 FL (ref 7.5–11.1)
PROT SERPL-MCNC: 6.2 G/DL (ref 6.4–8.2)
RBC # BLD AUTO: 4.27 M/MM3 (ref 3.6–5.2)
SODIUM SERPL-SCNC: 145 MMOL/L (ref 136–145)
WBC # BLD AUTO: 6.7 K/MM3 (ref 4–10)

## 2022-09-05 RX ADMIN — PIPERACILLIN SODIUM,TAZOBACTAM SODIUM SCH MLS/HR: 3; .375 INJECTION, POWDER, FOR SOLUTION INTRAVENOUS at 12:47

## 2022-09-05 RX ADMIN — ENOXAPARIN SODIUM SCH MG: 40 INJECTION SUBCUTANEOUS at 10:26

## 2022-09-05 RX ADMIN — CEFTRIAXONE SCH MLS/HR: 1 INJECTION, POWDER, FOR SOLUTION INTRAMUSCULAR; INTRAVENOUS at 10:17

## 2022-09-05 RX ADMIN — PIPERACILLIN SODIUM,TAZOBACTAM SODIUM SCH MLS/HR: 3; .375 INJECTION, POWDER, FOR SOLUTION INTRAVENOUS at 17:11

## 2022-09-05 RX ADMIN — HYDROCHLOROTHIAZIDE SCH: 25 TABLET ORAL at 13:59

## 2022-09-06 LAB
ALBUMIN SERPL-MCNC: 3.3 G/DL (ref 3.4–5)
ALP SERPL-CCNC: 69 U/L (ref 45–117)
ALT SERPL-CCNC: 17 U/L (ref 13–61)
ANION GAP SERPL CALC-SCNC: 6 MMOL/L (ref 8–16)
AST SERPL-CCNC: 18 U/L (ref 15–37)
BASOPHILS # BLD: 0.5 % (ref 0–2)
BILIRUB SERPL-MCNC: 0.7 MG/DL (ref 0.2–1)
BUN SERPL-MCNC: 17.9 MG/DL (ref 7–18)
CALCIUM SERPL-MCNC: 9.1 MG/DL (ref 8.5–10.1)
CHLORIDE SERPL-SCNC: 113 MMOL/L (ref 98–107)
CO2 SERPL-SCNC: 25 MMOL/L (ref 21–32)
CREAT SERPL-MCNC: 1.1 MG/DL (ref 0.55–1.3)
DEPRECATED RDW RBC AUTO: 14.5 % (ref 11.6–15.6)
EOSINOPHIL # BLD: 4.9 % (ref 0–4.5)
GLUCOSE SERPL-MCNC: 108 MG/DL (ref 74–106)
HCT VFR BLD CALC: 40.1 % (ref 32.4–45.2)
HGB BLD-MCNC: 13.5 GM/DL (ref 10.7–15.3)
LYMPHOCYTES # BLD: 30.1 % (ref 8–40)
MCH RBC QN AUTO: 29.9 PG (ref 25.7–33.7)
MCHC RBC AUTO-ENTMCNC: 33.7 G/DL (ref 32–36)
MCV RBC: 88.6 FL (ref 80–96)
MONOCYTES # BLD AUTO: 9.3 % (ref 3.8–10.2)
NEUTROPHILS # BLD: 55.2 % (ref 42.8–82.8)
PLATELET # BLD AUTO: 196 10^3/UL (ref 134–434)
PMV BLD: 7.7 FL (ref 7.5–11.1)
PROT SERPL-MCNC: 6.9 G/DL (ref 6.4–8.2)
RBC # BLD AUTO: 4.53 M/MM3 (ref 3.6–5.2)
SODIUM SERPL-SCNC: 144 MMOL/L (ref 136–145)
WBC # BLD AUTO: 6.4 K/MM3 (ref 4–10)

## 2022-09-06 RX ADMIN — PIPERACILLIN SODIUM,TAZOBACTAM SODIUM SCH MLS/HR: 3; .375 INJECTION, POWDER, FOR SOLUTION INTRAVENOUS at 01:29

## 2022-09-06 RX ADMIN — PIPERACILLIN SODIUM,TAZOBACTAM SODIUM SCH MLS/HR: 3; .375 INJECTION, POWDER, FOR SOLUTION INTRAVENOUS at 17:49

## 2022-09-06 RX ADMIN — HYDROCHLOROTHIAZIDE SCH MG: 25 TABLET ORAL at 06:26

## 2022-09-06 RX ADMIN — ENOXAPARIN SODIUM SCH MG: 40 INJECTION SUBCUTANEOUS at 09:41

## 2022-09-06 RX ADMIN — PIPERACILLIN SODIUM,TAZOBACTAM SODIUM SCH MLS/HR: 3; .375 INJECTION, POWDER, FOR SOLUTION INTRAVENOUS at 09:41

## 2022-09-07 LAB
ANION GAP SERPL CALC-SCNC: 8 MMOL/L (ref 8–16)
BUN SERPL-MCNC: 15.4 MG/DL (ref 7–18)
CALCIUM SERPL-MCNC: 9 MG/DL (ref 8.5–10.1)
CHLORIDE SERPL-SCNC: 107 MMOL/L (ref 98–107)
CO2 SERPL-SCNC: 29 MMOL/L (ref 21–32)
CREAT SERPL-MCNC: 0.9 MG/DL (ref 0.55–1.3)
DEPRECATED RDW RBC AUTO: 14.5 % (ref 11.6–15.6)
GLUCOSE SERPL-MCNC: 124 MG/DL (ref 74–106)
HCT VFR BLD CALC: 42.4 % (ref 32.4–45.2)
HGB BLD-MCNC: 13.9 GM/DL (ref 10.7–15.3)
MCH RBC QN AUTO: 28.8 PG (ref 25.7–33.7)
MCHC RBC AUTO-ENTMCNC: 32.7 G/DL (ref 32–36)
MCV RBC: 88.2 FL (ref 80–96)
PLATELET # BLD AUTO: 227 10^3/UL (ref 134–434)
PMV BLD: 8 FL (ref 7.5–11.1)
RBC # BLD AUTO: 4.81 M/MM3 (ref 3.6–5.2)
SODIUM SERPL-SCNC: 144 MMOL/L (ref 136–145)
WBC # BLD AUTO: 6.1 K/MM3 (ref 4–10)

## 2022-09-07 RX ADMIN — ENOXAPARIN SODIUM SCH MG: 40 INJECTION SUBCUTANEOUS at 10:14

## 2022-09-07 RX ADMIN — PIPERACILLIN SODIUM,TAZOBACTAM SODIUM SCH MLS/HR: 3; .375 INJECTION, POWDER, FOR SOLUTION INTRAVENOUS at 10:15

## 2022-09-07 RX ADMIN — PIPERACILLIN SODIUM,TAZOBACTAM SODIUM SCH MLS/HR: 3; .375 INJECTION, POWDER, FOR SOLUTION INTRAVENOUS at 18:04

## 2022-09-07 RX ADMIN — HYDROCHLOROTHIAZIDE SCH MG: 25 TABLET ORAL at 06:00

## 2022-09-07 RX ADMIN — PIPERACILLIN SODIUM,TAZOBACTAM SODIUM SCH MLS/HR: 3; .375 INJECTION, POWDER, FOR SOLUTION INTRAVENOUS at 01:36

## 2022-09-08 LAB
ANION GAP SERPL CALC-SCNC: 6 MMOL/L (ref 8–16)
BUN SERPL-MCNC: 17.2 MG/DL (ref 7–18)
CALCIUM SERPL-MCNC: 8.8 MG/DL (ref 8.5–10.1)
CHLORIDE SERPL-SCNC: 105 MMOL/L (ref 98–107)
CO2 SERPL-SCNC: 31 MMOL/L (ref 21–32)
CREAT SERPL-MCNC: 1 MG/DL (ref 0.55–1.3)
DEPRECATED RDW RBC AUTO: 14.2 % (ref 11.6–15.6)
GLUCOSE SERPL-MCNC: 95 MG/DL (ref 74–106)
HCT VFR BLD CALC: 38.7 % (ref 32.4–45.2)
HGB BLD-MCNC: 13.1 GM/DL (ref 10.7–15.3)
MCH RBC QN AUTO: 30 PG (ref 25.7–33.7)
MCHC RBC AUTO-ENTMCNC: 34 G/DL (ref 32–36)
MCV RBC: 88.2 FL (ref 80–96)
PLATELET # BLD AUTO: 212 10^3/UL (ref 134–434)
PMV BLD: 7.9 FL (ref 7.5–11.1)
RBC # BLD AUTO: 4.38 M/MM3 (ref 3.6–5.2)
SODIUM SERPL-SCNC: 143 MMOL/L (ref 136–145)
WBC # BLD AUTO: 6.3 K/MM3 (ref 4–10)

## 2022-09-08 RX ADMIN — HYDROCHLOROTHIAZIDE SCH MG: 25 TABLET ORAL at 06:37

## 2022-09-08 RX ADMIN — PIPERACILLIN SODIUM,TAZOBACTAM SODIUM SCH MLS/HR: 3; .375 INJECTION, POWDER, FOR SOLUTION INTRAVENOUS at 01:46

## 2022-09-08 RX ADMIN — PIPERACILLIN SODIUM,TAZOBACTAM SODIUM SCH: 3; .375 INJECTION, POWDER, FOR SOLUTION INTRAVENOUS at 17:41

## 2022-09-08 RX ADMIN — ENOXAPARIN SODIUM SCH MG: 40 INJECTION SUBCUTANEOUS at 11:20

## 2022-09-08 RX ADMIN — PIPERACILLIN SODIUM,TAZOBACTAM SODIUM SCH MLS/HR: 3; .375 INJECTION, POWDER, FOR SOLUTION INTRAVENOUS at 11:20

## 2022-09-08 RX ADMIN — PIPERACILLIN SODIUM,TAZOBACTAM SODIUM SCH MLS/HR: 3; .375 INJECTION, POWDER, FOR SOLUTION INTRAVENOUS at 17:45

## 2022-09-09 LAB
ANION GAP SERPL CALC-SCNC: 7 MMOL/L (ref 8–16)
BUN SERPL-MCNC: 17.9 MG/DL (ref 7–18)
CALCIUM SERPL-MCNC: 8.6 MG/DL (ref 8.5–10.1)
CHLORIDE SERPL-SCNC: 105 MMOL/L (ref 98–107)
CO2 SERPL-SCNC: 30 MMOL/L (ref 21–32)
CREAT SERPL-MCNC: 1 MG/DL (ref 0.55–1.3)
DEPRECATED RDW RBC AUTO: 14.5 % (ref 11.6–15.6)
GLUCOSE SERPL-MCNC: 129 MG/DL (ref 74–106)
HCT VFR BLD CALC: 37.6 % (ref 32.4–45.2)
HGB BLD-MCNC: 13 GM/DL (ref 10.7–15.3)
MCH RBC QN AUTO: 30.2 PG (ref 25.7–33.7)
MCHC RBC AUTO-ENTMCNC: 34.7 G/DL (ref 32–36)
MCV RBC: 87.2 FL (ref 80–96)
PLATELET # BLD AUTO: 217 10^3/UL (ref 134–434)
PMV BLD: 7.8 FL (ref 7.5–11.1)
RBC # BLD AUTO: 4.32 M/MM3 (ref 3.6–5.2)
SODIUM SERPL-SCNC: 141 MMOL/L (ref 136–145)
WBC # BLD AUTO: 5.9 K/MM3 (ref 4–10)

## 2022-09-09 RX ADMIN — ENOXAPARIN SODIUM SCH MG: 40 INJECTION SUBCUTANEOUS at 09:59

## 2022-09-09 RX ADMIN — PIPERACILLIN SODIUM,TAZOBACTAM SODIUM SCH MLS/HR: 3; .375 INJECTION, POWDER, FOR SOLUTION INTRAVENOUS at 01:57

## 2022-09-09 RX ADMIN — PIPERACILLIN SODIUM,TAZOBACTAM SODIUM SCH MLS/HR: 3; .375 INJECTION, POWDER, FOR SOLUTION INTRAVENOUS at 10:00

## 2022-09-09 RX ADMIN — HYDROCHLOROTHIAZIDE SCH MG: 25 TABLET ORAL at 06:39

## 2022-09-09 RX ADMIN — PIPERACILLIN SODIUM,TAZOBACTAM SODIUM SCH MLS/HR: 3; .375 INJECTION, POWDER, FOR SOLUTION INTRAVENOUS at 18:04

## 2022-09-10 LAB
DEPRECATED RDW RBC AUTO: 14.7 % (ref 11.6–15.6)
HCT VFR BLD CALC: 39.6 % (ref 32.4–45.2)
HGB BLD-MCNC: 13.3 GM/DL (ref 10.7–15.3)
MCH RBC QN AUTO: 29.2 PG (ref 25.7–33.7)
MCHC RBC AUTO-ENTMCNC: 33.6 G/DL (ref 32–36)
MCV RBC: 86.9 FL (ref 80–96)
PLATELET # BLD AUTO: 241 10^3/UL (ref 134–434)
PMV BLD: 8 FL (ref 7.5–11.1)
RBC # BLD AUTO: 4.56 M/MM3 (ref 3.6–5.2)
WBC # BLD AUTO: 7 K/MM3 (ref 4–10)

## 2022-09-10 RX ADMIN — PIPERACILLIN SODIUM,TAZOBACTAM SODIUM SCH MLS/HR: 3; .375 INJECTION, POWDER, FOR SOLUTION INTRAVENOUS at 01:39

## 2022-09-10 RX ADMIN — PIPERACILLIN SODIUM,TAZOBACTAM SODIUM SCH MLS/HR: 3; .375 INJECTION, POWDER, FOR SOLUTION INTRAVENOUS at 09:46

## 2022-09-10 RX ADMIN — PIPERACILLIN SODIUM,TAZOBACTAM SODIUM SCH MLS/HR: 3; .375 INJECTION, POWDER, FOR SOLUTION INTRAVENOUS at 17:27

## 2022-09-10 RX ADMIN — HYDROCHLOROTHIAZIDE SCH: 25 TABLET ORAL at 06:15

## 2022-09-11 VITALS — SYSTOLIC BLOOD PRESSURE: 125 MMHG | HEART RATE: 80 BPM | DIASTOLIC BLOOD PRESSURE: 71 MMHG | TEMPERATURE: 99.2 F

## 2022-09-11 VITALS — RESPIRATION RATE: 18 BRPM

## 2022-09-11 LAB
ANION GAP SERPL CALC-SCNC: 7 MMOL/L (ref 8–16)
BASOPHILS # BLD: 1.1 % (ref 0–2)
BUN SERPL-MCNC: 31.8 MG/DL (ref 7–18)
CALCIUM SERPL-MCNC: 8.6 MG/DL (ref 8.5–10.1)
CHLORIDE SERPL-SCNC: 106 MMOL/L (ref 98–107)
CO2 SERPL-SCNC: 29 MMOL/L (ref 21–32)
CREAT SERPL-MCNC: 1.1 MG/DL (ref 0.55–1.3)
DEPRECATED RDW RBC AUTO: 14.7 % (ref 11.6–15.6)
EOSINOPHIL # BLD: 6 % (ref 0–4.5)
GLUCOSE SERPL-MCNC: 93 MG/DL (ref 74–106)
HCT VFR BLD CALC: 40.7 % (ref 32.4–45.2)
HGB BLD-MCNC: 13.8 GM/DL (ref 10.7–15.3)
LYMPHOCYTES # BLD: 34.2 % (ref 8–40)
MCH RBC QN AUTO: 29.8 PG (ref 25.7–33.7)
MCHC RBC AUTO-ENTMCNC: 33.8 G/DL (ref 32–36)
MCV RBC: 88.2 FL (ref 80–96)
MONOCYTES # BLD AUTO: 7.9 % (ref 3.8–10.2)
NEUTROPHILS # BLD: 50.8 % (ref 42.8–82.8)
PLATELET # BLD AUTO: 242 10^3/UL (ref 134–434)
PMV BLD: 7.8 FL (ref 7.5–11.1)
RBC # BLD AUTO: 4.62 M/MM3 (ref 3.6–5.2)
SODIUM SERPL-SCNC: 142 MMOL/L (ref 136–145)
WBC # BLD AUTO: 7.5 K/MM3 (ref 4–10)

## 2022-09-11 RX ADMIN — PIPERACILLIN SODIUM,TAZOBACTAM SODIUM SCH MLS/HR: 3; .375 INJECTION, POWDER, FOR SOLUTION INTRAVENOUS at 01:35

## 2022-09-11 RX ADMIN — PIPERACILLIN SODIUM,TAZOBACTAM SODIUM SCH MLS/HR: 3; .375 INJECTION, POWDER, FOR SOLUTION INTRAVENOUS at 09:57

## 2022-09-11 RX ADMIN — HYDROCHLOROTHIAZIDE SCH MG: 25 TABLET ORAL at 06:47

## 2022-11-30 ENCOUNTER — OFFICE (OUTPATIENT)
Dept: URBAN - METROPOLITAN AREA CLINIC 30 | Facility: CLINIC | Age: 82
Setting detail: OPHTHALMOLOGY
End: 2022-11-30
Payer: MEDICARE

## 2022-11-30 DIAGNOSIS — H01.005: ICD-10-CM

## 2022-11-30 DIAGNOSIS — H40.1123: ICD-10-CM

## 2022-11-30 DIAGNOSIS — H01.004: ICD-10-CM

## 2022-11-30 DIAGNOSIS — H40.1113: ICD-10-CM

## 2022-11-30 DIAGNOSIS — Z96.1: ICD-10-CM

## 2022-11-30 DIAGNOSIS — H16.223: ICD-10-CM

## 2022-11-30 DIAGNOSIS — H01.002: ICD-10-CM

## 2022-11-30 DIAGNOSIS — H01.001: ICD-10-CM

## 2022-11-30 DIAGNOSIS — H02.055: ICD-10-CM

## 2022-11-30 PROCEDURE — 92020 GONIOSCOPY: CPT | Performed by: OPHTHALMOLOGY

## 2022-11-30 PROCEDURE — 99214 OFFICE O/P EST MOD 30 MIN: CPT | Performed by: OPHTHALMOLOGY

## 2022-11-30 PROCEDURE — 92083 EXTENDED VISUAL FIELD XM: CPT | Performed by: OPHTHALMOLOGY

## 2022-11-30 ASSESSMENT — REFRACTION_MANIFEST
OD_SPHERE: -0.50
OS_AXIS: 175
OD_AXIS: 120
OD_SPHERE: -0.75
OS_VA1: 20/25-2
OS_AXIS: 130
OS_SPHERE: -0.50
OS_CYLINDER: +1.25
OS_VA1: 20/40-2
OD_CYLINDER: +0.75
OS_CYLINDER: +1.25
OS_ADD: +3.00
OS_SPHERE: -0.75
OD_VA1: 20-25+1
OD_AXIS: 145
OD_VA1: 20/30
OD_ADD: +3.00
OD_CYLINDER: +0.75

## 2022-11-30 ASSESSMENT — CONFRONTATIONAL VISUAL FIELD TEST (CVF)
OS_FINDINGS: FULL
OD_FINDINGS: FULL

## 2022-11-30 ASSESSMENT — REFRACTION_AUTOREFRACTION
OS_AXIS: 135
OS_CYLINDER: +1.50
OS_SPHERE: -0.75
OD_AXIS: 135
OD_CYLINDER: +1.00
OD_SPHERE: -0.75

## 2022-11-30 ASSESSMENT — SPHEQUIV_DERIVED
OS_SPHEQUIV: -0.125
OS_SPHEQUIV: 0
OS_SPHEQUIV: 0.125
OD_SPHEQUIV: -0.125
OD_SPHEQUIV: -0.375
OD_SPHEQUIV: -0.25

## 2022-11-30 ASSESSMENT — LID EXAM ASSESSMENTS
OS_BLEPHARITIS: LLL LUL 2+
OD_BLEPHARITIS: RLL RUL 2+

## 2022-11-30 ASSESSMENT — VISUAL ACUITY
OD_BCVA: 20/40+2
OS_BCVA: 20/40

## 2022-12-10 ENCOUNTER — HOSPITAL ENCOUNTER (EMERGENCY)
Dept: HOSPITAL 74 - JER | Age: 82
Discharge: HOME | End: 2022-12-10
Payer: COMMERCIAL

## 2022-12-10 VITALS
DIASTOLIC BLOOD PRESSURE: 83 MMHG | RESPIRATION RATE: 20 BRPM | TEMPERATURE: 97.8 F | HEART RATE: 74 BPM | SYSTOLIC BLOOD PRESSURE: 142 MMHG

## 2022-12-10 VITALS — BODY MASS INDEX: 23.8 KG/M2

## 2022-12-10 DIAGNOSIS — T78.40XA: Primary | ICD-10-CM

## 2022-12-10 PROCEDURE — 3E033GC INTRODUCTION OF OTHER THERAPEUTIC SUBSTANCE INTO PERIPHERAL VEIN, PERCUTANEOUS APPROACH: ICD-10-PCS

## 2022-12-10 PROCEDURE — 3E0333Z INTRODUCTION OF ANTI-INFLAMMATORY INTO PERIPHERAL VEIN, PERCUTANEOUS APPROACH: ICD-10-PCS

## 2022-12-28 ENCOUNTER — OFFICE (OUTPATIENT)
Dept: URBAN - METROPOLITAN AREA CLINIC 30 | Facility: CLINIC | Age: 82
Setting detail: OPHTHALMOLOGY
End: 2022-12-28
Payer: MEDICARE

## 2022-12-28 DIAGNOSIS — Z96.1: ICD-10-CM

## 2022-12-28 DIAGNOSIS — H40.1113: ICD-10-CM

## 2022-12-28 DIAGNOSIS — H40.1123: ICD-10-CM

## 2022-12-28 DIAGNOSIS — H02.055: ICD-10-CM

## 2022-12-28 DIAGNOSIS — H16.223: ICD-10-CM

## 2022-12-28 DIAGNOSIS — H01.002: ICD-10-CM

## 2022-12-28 DIAGNOSIS — H01.001: ICD-10-CM

## 2022-12-28 DIAGNOSIS — H01.004: ICD-10-CM

## 2022-12-28 DIAGNOSIS — H01.005: ICD-10-CM

## 2022-12-28 PROCEDURE — 99213 OFFICE O/P EST LOW 20 MIN: CPT | Performed by: OPHTHALMOLOGY

## 2022-12-28 ASSESSMENT — REFRACTION_MANIFEST
OS_CYLINDER: +1.25
OD_ADD: +3.00
OS_AXIS: 130
OS_SPHERE: -0.50
OD_AXIS: 145
OS_VA1: 20/40-2
OD_AXIS: 120
OD_CYLINDER: +0.75
OS_ADD: +3.00
OD_CYLINDER: +0.75
OD_SPHERE: -0.50
OS_SPHERE: -0.75
OD_VA1: 20-25+1
OD_SPHERE: -0.75
OD_VA1: 20/30
OS_AXIS: 175
OS_CYLINDER: +1.25
OS_VA1: 20/25-2

## 2022-12-28 ASSESSMENT — REFRACTION_AUTOREFRACTION
OD_CYLINDER: +1.00
OD_SPHERE: -0.75
OS_SPHERE: -0.75
OS_AXIS: 135
OS_CYLINDER: +1.50
OD_AXIS: 135

## 2022-12-28 ASSESSMENT — TONOMETRY
OD_IOP_MMHG: 13
OS_IOP_MMHG: 14

## 2022-12-28 ASSESSMENT — KERATOMETRY: METHOD_AUTO_MANUAL: MANUAL

## 2022-12-28 ASSESSMENT — SPHEQUIV_DERIVED
OD_SPHEQUIV: -0.375
OD_SPHEQUIV: -0.25
OS_SPHEQUIV: 0
OS_SPHEQUIV: -0.125
OS_SPHEQUIV: 0.125
OD_SPHEQUIV: -0.125

## 2022-12-28 ASSESSMENT — LID EXAM ASSESSMENTS
OD_BLEPHARITIS: RLL RUL 2+
OS_BLEPHARITIS: LLL LUL 2+

## 2022-12-28 ASSESSMENT — VISUAL ACUITY
OS_BCVA: 20/40+2
OD_BCVA: 20/25-1

## 2022-12-28 ASSESSMENT — CONFRONTATIONAL VISUAL FIELD TEST (CVF)
OS_FINDINGS: FULL
OD_FINDINGS: FULL

## 2023-02-01 ENCOUNTER — OFFICE (OUTPATIENT)
Dept: URBAN - METROPOLITAN AREA CLINIC 30 | Facility: CLINIC | Age: 83
Setting detail: OPHTHALMOLOGY
End: 2023-02-01
Payer: MEDICARE

## 2023-02-01 DIAGNOSIS — H40.1113: ICD-10-CM

## 2023-02-01 PROCEDURE — 66030 INJECTION TREATMENT OF EYE: CPT | Performed by: OPHTHALMOLOGY

## 2023-02-01 ASSESSMENT — LID EXAM ASSESSMENTS
OD_BLEPHARITIS: RLL RUL 2+
OS_BLEPHARITIS: LLL LUL 2+

## 2023-02-01 ASSESSMENT — REFRACTION_MANIFEST
OD_AXIS: 145
OS_AXIS: 130
OS_CYLINDER: +1.25
OD_CYLINDER: +0.75
OD_VA1: 20/30
OS_VA1: 20/40-2
OS_ADD: +3.00
OS_SPHERE: -0.75
OD_AXIS: 120
OD_SPHERE: -0.75
OD_SPHERE: -0.50
OS_CYLINDER: +1.25
OS_VA1: 20/25-2
OD_VA1: 20-25+1
OD_CYLINDER: +0.75
OD_ADD: +3.00
OS_SPHERE: -0.50
OS_AXIS: 175

## 2023-02-01 ASSESSMENT — REFRACTION_AUTOREFRACTION
OS_SPHERE: -0.75
OD_AXIS: 135
OD_CYLINDER: +1.00
OD_SPHERE: -0.75
OS_CYLINDER: +1.50
OS_AXIS: 135

## 2023-02-01 ASSESSMENT — VISUAL ACUITY
OD_BCVA: 20/30
OS_BCVA: 20/40

## 2023-02-01 ASSESSMENT — LID POSITION - ECTROPION
OD_ECTROPION: RUL T
OS_ECTROPION: LUL T

## 2023-02-01 ASSESSMENT — SPHEQUIV_DERIVED
OS_SPHEQUIV: -0.125
OD_SPHEQUIV: -0.25
OS_SPHEQUIV: 0.125
OS_SPHEQUIV: 0
OD_SPHEQUIV: -0.125
OD_SPHEQUIV: -0.375

## 2023-02-01 ASSESSMENT — TONOMETRY
OS_IOP_MMHG: 14
OD_IOP_MMHG: 16

## 2023-02-01 ASSESSMENT — CONFRONTATIONAL VISUAL FIELD TEST (CVF)
OD_FINDINGS: FULL
OS_FINDINGS: FULL

## 2023-02-01 ASSESSMENT — KERATOMETRY: METHOD_AUTO_MANUAL: MANUAL

## 2023-02-22 ENCOUNTER — OFFICE (OUTPATIENT)
Dept: URBAN - METROPOLITAN AREA CLINIC 30 | Facility: CLINIC | Age: 83
Setting detail: OPHTHALMOLOGY
End: 2023-02-22
Payer: MEDICARE

## 2023-02-22 DIAGNOSIS — H40.1113: ICD-10-CM

## 2023-02-22 DIAGNOSIS — H10.012: ICD-10-CM

## 2023-02-22 DIAGNOSIS — H40.1123: ICD-10-CM

## 2023-02-22 DIAGNOSIS — H02.055: ICD-10-CM

## 2023-02-22 PROBLEM — L30.8 DERMATITIS, OTHER SPECIFIED ; BOTH EYES: Status: RESOLVED | Noted: 2023-02-01 | Resolved: 2023-02-22

## 2023-02-22 PROBLEM — H01.001 BLEPHARITIS; RIGHT UPPER LID, RIGHT LOWER LID, LEFT UPPER LID, LEFT LOWER LID: Status: ACTIVE | Noted: 2023-02-22

## 2023-02-22 PROBLEM — H02.135 ECTROPION-SENILE; LEFT LOWER LID: Status: ACTIVE | Noted: 2023-02-22

## 2023-02-22 PROBLEM — H01.005 BLEPHARITIS; RIGHT UPPER LID, RIGHT LOWER LID, LEFT UPPER LID, LEFT LOWER LID: Status: ACTIVE | Noted: 2023-02-22

## 2023-02-22 PROBLEM — H01.002 BLEPHARITIS; RIGHT UPPER LID, RIGHT LOWER LID, LEFT UPPER LID, LEFT LOWER LID: Status: ACTIVE | Noted: 2023-02-22

## 2023-02-22 PROBLEM — H01.004 BLEPHARITIS; RIGHT UPPER LID, RIGHT LOWER LID, LEFT UPPER LID, LEFT LOWER LID: Status: ACTIVE | Noted: 2023-02-22

## 2023-02-22 PROCEDURE — 99213 OFFICE O/P EST LOW 20 MIN: CPT | Performed by: OPHTHALMOLOGY

## 2023-02-22 ASSESSMENT — REFRACTION_MANIFEST
OD_VA1: 20-25+1
OS_ADD: +3.00
OS_AXIS: 130
OS_AXIS: 175
OD_SPHERE: -0.50
OD_ADD: +3.00
OD_VA1: 20/30
OD_CYLINDER: +0.75
OS_VA1: 20/40-2
OD_CYLINDER: +0.75
OS_SPHERE: -0.50
OS_VA1: 20/25-2
OD_AXIS: 145
OD_AXIS: 120
OD_SPHERE: -0.75
OS_CYLINDER: +1.25
OS_CYLINDER: +1.25
OS_SPHERE: -0.75

## 2023-02-22 ASSESSMENT — SPHEQUIV_DERIVED
OS_SPHEQUIV: 0
OD_SPHEQUIV: -0.375
OD_SPHEQUIV: -0.125
OS_SPHEQUIV: 0.125
OD_SPHEQUIV: -0.25
OS_SPHEQUIV: -0.125

## 2023-02-22 ASSESSMENT — LID EXAM ASSESSMENTS
OS_BLEPHARITIS: LLL LUL 2+
OD_BLEPHARITIS: RLL RUL 2+

## 2023-02-22 ASSESSMENT — REFRACTION_AUTOREFRACTION
OS_AXIS: 135
OS_CYLINDER: +1.50
OD_CYLINDER: +1.00
OD_AXIS: 135
OS_SPHERE: -0.75
OD_SPHERE: -0.75

## 2023-02-22 ASSESSMENT — CONFRONTATIONAL VISUAL FIELD TEST (CVF)
OD_FINDINGS: FULL
OS_FINDINGS: FULL

## 2023-02-22 ASSESSMENT — KERATOMETRY: METHOD_AUTO_MANUAL: MANUAL

## 2023-02-22 ASSESSMENT — VISUAL ACUITY
OD_BCVA: 20/50-2
OS_BCVA: 20/50-1

## 2023-02-22 ASSESSMENT — LID POSITION - ECTROPION: OS_ECTROPION: LLL 1+

## 2023-03-05 NOTE — ED ADULT NURSE NOTE - CAS DISCH BELONGINGS RETURNED
FAMILY HISTORY:  Father  Still living? No  Family history of lung cancer, Age at diagnosis: Age Unknown    
Yes

## 2023-03-22 ENCOUNTER — OFFICE (OUTPATIENT)
Dept: URBAN - METROPOLITAN AREA CLINIC 30 | Facility: CLINIC | Age: 83
Setting detail: OPHTHALMOLOGY
End: 2023-03-22
Payer: MEDICARE

## 2023-03-22 ENCOUNTER — RX ONLY (RX ONLY)
Age: 83
End: 2023-03-22

## 2023-03-22 DIAGNOSIS — H01.002: ICD-10-CM

## 2023-03-22 DIAGNOSIS — H02.055: ICD-10-CM

## 2023-03-22 DIAGNOSIS — H40.1113: ICD-10-CM

## 2023-03-22 DIAGNOSIS — H01.001: ICD-10-CM

## 2023-03-22 PROBLEM — H01.004 BLEPHARITIS; RIGHT UPPER LID, RIGHT LOWER LID, LEFT UPPER LID, LEFT LOWER LID: Status: ACTIVE | Noted: 2023-03-22

## 2023-03-22 PROBLEM — H01.005 BLEPHARITIS; RIGHT UPPER LID, RIGHT LOWER LID, LEFT UPPER LID, LEFT LOWER LID: Status: ACTIVE | Noted: 2023-03-22

## 2023-03-22 PROCEDURE — 99213 OFFICE O/P EST LOW 20 MIN: CPT | Performed by: OPHTHALMOLOGY

## 2023-03-22 ASSESSMENT — REFRACTION_AUTOREFRACTION
OS_CYLINDER: +1.50
OD_SPHERE: -0.75
OD_AXIS: 135
OS_AXIS: 135
OS_SPHERE: -0.75
OD_CYLINDER: +1.00

## 2023-03-22 ASSESSMENT — REFRACTION_MANIFEST
OS_SPHERE: -0.75
OS_AXIS: 130
OS_SPHERE: -0.50
OD_CYLINDER: +0.75
OD_SPHERE: -0.75
OD_CYLINDER: +0.75
OS_VA1: 20/25-2
OD_AXIS: 120
OS_VA1: 20/40-2
OD_ADD: +3.00
OD_VA1: 20-25+1
OS_ADD: +3.00
OS_CYLINDER: +1.25
OD_SPHERE: -0.50
OD_AXIS: 145
OS_AXIS: 175
OD_VA1: 20/30
OS_CYLINDER: +1.25

## 2023-03-22 ASSESSMENT — SPHEQUIV_DERIVED
OD_SPHEQUIV: -0.375
OS_SPHEQUIV: 0
OD_SPHEQUIV: -0.25
OD_SPHEQUIV: -0.125
OS_SPHEQUIV: 0.125
OS_SPHEQUIV: -0.125

## 2023-03-22 ASSESSMENT — LID EXAM ASSESSMENTS
OD_BLEPHARITIS: RLL RUL 2+
OS_BLEPHARITIS: LLL LUL 2+

## 2023-03-22 ASSESSMENT — CONFRONTATIONAL VISUAL FIELD TEST (CVF)
OS_FINDINGS: FULL
OD_FINDINGS: FULL

## 2023-03-22 ASSESSMENT — KERATOMETRY: METHOD_AUTO_MANUAL: MANUAL

## 2023-03-22 ASSESSMENT — LID POSITION - ECTROPION: OS_ECTROPION: LLL 1+

## 2023-03-22 ASSESSMENT — VISUAL ACUITY
OD_BCVA: 20/40-2
OS_BCVA: 20/50-1

## 2023-05-26 ENCOUNTER — OFFICE (OUTPATIENT)
Dept: URBAN - METROPOLITAN AREA CLINIC 29 | Facility: CLINIC | Age: 83
Setting detail: OPHTHALMOLOGY
End: 2023-05-26
Payer: MEDICARE

## 2023-05-26 DIAGNOSIS — H16.223: ICD-10-CM

## 2023-05-26 DIAGNOSIS — H40.1113: ICD-10-CM

## 2023-05-26 DIAGNOSIS — H01.002: ICD-10-CM

## 2023-05-26 DIAGNOSIS — H01.005: ICD-10-CM

## 2023-05-26 DIAGNOSIS — H02.135: ICD-10-CM

## 2023-05-26 DIAGNOSIS — H01.001: ICD-10-CM

## 2023-05-26 DIAGNOSIS — H02.055: ICD-10-CM

## 2023-05-26 DIAGNOSIS — Z96.1: ICD-10-CM

## 2023-05-26 DIAGNOSIS — H01.004: ICD-10-CM

## 2023-05-26 DIAGNOSIS — H40.1123: ICD-10-CM

## 2023-05-26 PROCEDURE — 99214 OFFICE O/P EST MOD 30 MIN: CPT | Performed by: OPHTHALMOLOGY

## 2023-05-26 ASSESSMENT — LID EXAM ASSESSMENTS
OS_BLEPHARITIS: LLL LUL 2+
OD_BLEPHARITIS: RLL RUL 2+

## 2023-05-26 ASSESSMENT — CONFRONTATIONAL VISUAL FIELD TEST (CVF)
OD_FINDINGS: FULL
OS_FINDINGS: FULL

## 2023-05-26 ASSESSMENT — TONOMETRY
OS_IOP_MMHG: 14
OD_IOP_MMHG: 20

## 2023-05-26 ASSESSMENT — LID POSITION - ECTROPION: OS_ECTROPION: LLL 1+

## 2023-06-01 ASSESSMENT — SPHEQUIV_DERIVED
OD_SPHEQUIV: -0.375
OD_SPHEQUIV: -0.25
OS_SPHEQUIV: 0.125
OS_SPHEQUIV: 0
OD_SPHEQUIV: -0.125
OS_SPHEQUIV: -0.125

## 2023-06-01 ASSESSMENT — REFRACTION_MANIFEST
OD_VA1: 20/30
OS_CYLINDER: +1.25
OD_VA1: 20-25+1
OS_CYLINDER: +1.25
OS_VA1: 20/40-2
OS_AXIS: 130
OD_CYLINDER: +0.75
OS_ADD: +3.00
OD_SPHERE: -0.75
OS_AXIS: 175
OD_AXIS: 145
OD_CYLINDER: +0.75
OS_VA1: 20/25-2
OD_AXIS: 120
OD_ADD: +3.00
OD_SPHERE: -0.50
OS_SPHERE: -0.50
OS_SPHERE: -0.75

## 2023-06-01 ASSESSMENT — KERATOMETRY: METHOD_AUTO_MANUAL: MANUAL

## 2023-06-01 ASSESSMENT — REFRACTION_AUTOREFRACTION
OD_AXIS: 135
OS_CYLINDER: +1.50
OS_SPHERE: -0.75
OD_CYLINDER: +1.00
OD_SPHERE: -0.75
OS_AXIS: 135

## 2023-06-01 ASSESSMENT — VISUAL ACUITY
OS_BCVA: 20/30-2
OD_BCVA: 20/30-2

## 2023-08-31 ENCOUNTER — AMBULATORY SURGERY CENTER (OUTPATIENT)
Dept: URBAN - METROPOLITAN AREA SURGERY 17 | Facility: SURGERY | Age: 83
Setting detail: OPHTHALMOLOGY
End: 2023-08-31
Payer: MEDICARE

## 2023-08-31 DIAGNOSIS — H40.1113: ICD-10-CM

## 2023-08-31 PROCEDURE — 66710 CILIARY TRANSSLERAL THERAPY: CPT | Performed by: OPHTHALMOLOGY

## 2023-09-01 ENCOUNTER — OFFICE (OUTPATIENT)
Dept: URBAN - METROPOLITAN AREA CLINIC 29 | Facility: CLINIC | Age: 83
Setting detail: OPHTHALMOLOGY
End: 2023-09-01
Payer: MEDICARE

## 2023-09-01 DIAGNOSIS — H01.001: ICD-10-CM

## 2023-09-01 DIAGNOSIS — H40.1123: ICD-10-CM

## 2023-09-01 DIAGNOSIS — H01.005: ICD-10-CM

## 2023-09-01 DIAGNOSIS — Z96.1: ICD-10-CM

## 2023-09-01 DIAGNOSIS — H01.002: ICD-10-CM

## 2023-09-01 DIAGNOSIS — H02.055: ICD-10-CM

## 2023-09-01 DIAGNOSIS — H02.135: ICD-10-CM

## 2023-09-01 DIAGNOSIS — H16.223: ICD-10-CM

## 2023-09-01 DIAGNOSIS — H01.004: ICD-10-CM

## 2023-09-01 DIAGNOSIS — H40.1113: ICD-10-CM

## 2023-09-01 PROCEDURE — 99024 POSTOP FOLLOW-UP VISIT: CPT | Performed by: OPHTHALMOLOGY

## 2023-09-01 ASSESSMENT — REFRACTION_MANIFEST
OS_SPHERE: -0.50
OS_VA1: 20/25-2
OS_AXIS: 130
OD_AXIS: 145
OS_CYLINDER: +1.25
OS_ADD: +3.00
OD_CYLINDER: +0.75
OD_ADD: +3.00
OD_SPHERE: -0.75
OD_SPHERE: -0.50
OS_CYLINDER: +1.25
OD_VA1: 20-25+1
OS_AXIS: 175
OD_VA1: 20/30
OD_CYLINDER: +0.75
OD_AXIS: 120
OS_SPHERE: -0.75
OS_VA1: 20/40-2

## 2023-09-01 ASSESSMENT — CONFRONTATIONAL VISUAL FIELD TEST (CVF)
OD_FINDINGS: FULL
OS_FINDINGS: FULL

## 2023-09-01 ASSESSMENT — SPHEQUIV_DERIVED
OS_SPHEQUIV: -0.125
OS_SPHEQUIV: 0
OD_SPHEQUIV: -0.125
OD_SPHEQUIV: -0.25
OD_SPHEQUIV: -0.375
OS_SPHEQUIV: 0.125

## 2023-09-01 ASSESSMENT — REFRACTION_AUTOREFRACTION
OS_SPHERE: -0.75
OD_CYLINDER: +1.00
OS_CYLINDER: +1.50
OD_AXIS: 135
OS_AXIS: 135
OD_SPHERE: -0.75

## 2023-09-01 ASSESSMENT — VISUAL ACUITY
OD_BCVA: 20/40+2
OS_BCVA: 20/40-2

## 2023-09-01 ASSESSMENT — TONOMETRY
OS_IOP_MMHG: 18
OD_IOP_MMHG: 14

## 2023-09-01 ASSESSMENT — KERATOMETRY: METHOD_AUTO_MANUAL: MANUAL

## 2023-09-01 ASSESSMENT — LID POSITION - ECTROPION: OS_ECTROPION: LLL 1+

## 2023-09-01 ASSESSMENT — LID EXAM ASSESSMENTS
OD_BLEPHARITIS: RLL RUL 2+
OS_BLEPHARITIS: LLL LUL 2+

## 2023-09-01 ASSESSMENT — CORNEAL EDEMA CLINICAL DESCRIPTION: OD_CORNEALEDEMA: T

## 2023-09-08 ENCOUNTER — RX ONLY (RX ONLY)
Age: 83
End: 2023-09-08

## 2023-09-08 ENCOUNTER — OFFICE (OUTPATIENT)
Dept: URBAN - METROPOLITAN AREA CLINIC 29 | Facility: CLINIC | Age: 83
Setting detail: OPHTHALMOLOGY
End: 2023-09-08
Payer: MEDICARE

## 2023-09-08 DIAGNOSIS — H40.1113: ICD-10-CM

## 2023-09-08 PROBLEM — H20.011 UVEITIS; RIGHT EYE: Status: ACTIVE | Noted: 2023-09-08

## 2023-09-08 PROCEDURE — TRIAMCINOL: Performed by: OPHTHALMOLOGY

## 2023-09-08 PROCEDURE — 67515 INJECT/TREAT EYE SOCKET: CPT | Performed by: OPHTHALMOLOGY

## 2023-09-08 ASSESSMENT — REFRACTION_MANIFEST
OD_VA1: 20-25+1
OS_SPHERE: -0.75
OD_AXIS: 145
OS_ADD: +3.00
OD_CYLINDER: +0.75
OS_AXIS: 130
OD_SPHERE: -0.75
OS_AXIS: 175
OS_CYLINDER: +1.25
OD_ADD: +3.00
OS_VA1: 20/25-2
OD_VA1: 20/30
OS_SPHERE: -0.50
OD_SPHERE: -0.50
OS_CYLINDER: +1.25
OS_VA1: 20/40-2
OD_AXIS: 120
OD_CYLINDER: +0.75

## 2023-09-08 ASSESSMENT — REFRACTION_AUTOREFRACTION
OS_AXIS: 135
OD_CYLINDER: +1.00
OD_AXIS: 135
OS_SPHERE: -0.75
OD_SPHERE: -0.75
OS_CYLINDER: +1.50

## 2023-09-08 ASSESSMENT — LID EXAM ASSESSMENTS
OS_BLEPHARITIS: LLL LUL 2+
OD_BLEPHARITIS: RLL RUL 2+

## 2023-09-08 ASSESSMENT — SPHEQUIV_DERIVED
OD_SPHEQUIV: -0.25
OD_SPHEQUIV: -0.125
OS_SPHEQUIV: -0.125
OS_SPHEQUIV: 0
OS_SPHEQUIV: 0.125
OD_SPHEQUIV: -0.375

## 2023-09-08 ASSESSMENT — LID POSITION - ECTROPION: OS_ECTROPION: LLL 1+

## 2023-09-08 ASSESSMENT — TONOMETRY
OS_IOP_MMHG: 14
OD_IOP_MMHG: 12

## 2023-09-08 ASSESSMENT — VISUAL ACUITY
OS_BCVA: 20/60-2
OD_BCVA: 20/40+2

## 2023-09-08 ASSESSMENT — KERATOMETRY: METHOD_AUTO_MANUAL: MANUAL

## 2023-09-08 ASSESSMENT — CONFRONTATIONAL VISUAL FIELD TEST (CVF)
OD_FINDINGS: FULL
OS_FINDINGS: FULL

## 2023-09-08 ASSESSMENT — CORNEAL EDEMA CLINICAL DESCRIPTION: OD_CORNEALEDEMA: T

## 2023-09-08 ASSESSMENT — CORNEAL EDEMA - FOLDS/STRIAE: OD_FOLDSSTRIAE: 1+

## 2023-10-10 ENCOUNTER — OFFICE (OUTPATIENT)
Dept: URBAN - METROPOLITAN AREA CLINIC 29 | Facility: CLINIC | Age: 83
Setting detail: OPHTHALMOLOGY
End: 2023-10-10
Payer: MEDICARE

## 2023-10-10 DIAGNOSIS — H01.001: ICD-10-CM

## 2023-10-10 DIAGNOSIS — H40.1123: ICD-10-CM

## 2023-10-10 DIAGNOSIS — H01.004: ICD-10-CM

## 2023-10-10 DIAGNOSIS — H40.1113: ICD-10-CM

## 2023-10-10 DIAGNOSIS — H01.002: ICD-10-CM

## 2023-10-10 DIAGNOSIS — H02.135: ICD-10-CM

## 2023-10-10 DIAGNOSIS — H20.011: ICD-10-CM

## 2023-10-10 DIAGNOSIS — H16.223: ICD-10-CM

## 2023-10-10 DIAGNOSIS — Z96.1: ICD-10-CM

## 2023-10-10 DIAGNOSIS — H01.005: ICD-10-CM

## 2023-10-10 DIAGNOSIS — H02.055: ICD-10-CM

## 2023-10-10 PROCEDURE — 99024 POSTOP FOLLOW-UP VISIT: CPT | Performed by: OPHTHALMOLOGY

## 2023-10-10 ASSESSMENT — REFRACTION_MANIFEST
OS_SPHERE: -0.75
OS_ADD: +3.00
OD_CYLINDER: +0.75
OS_VA1: 20/40-2
OS_AXIS: 130
OD_AXIS: 120
OS_SPHERE: -0.50
OD_ADD: +3.00
OD_SPHERE: -0.75
OS_CYLINDER: +1.25
OD_VA1: 20-25+1
OS_VA1: 20/25-2
OD_SPHERE: -0.50
OD_AXIS: 145
OS_CYLINDER: +1.25
OD_VA1: 20/30
OD_CYLINDER: +0.75
OS_AXIS: 175

## 2023-10-10 ASSESSMENT — LID EXAM ASSESSMENTS
OD_BLEPHARITIS: RLL RUL 2+
OS_BLEPHARITIS: LLL LUL 2+

## 2023-10-10 ASSESSMENT — SPHEQUIV_DERIVED
OS_SPHEQUIV: 0
OD_SPHEQUIV: -0.25
OS_SPHEQUIV: 0.125
OD_SPHEQUIV: -0.375
OD_SPHEQUIV: -0.125
OS_SPHEQUIV: -0.125

## 2023-10-10 ASSESSMENT — TONOMETRY
OD_IOP_MMHG: 19
OS_IOP_MMHG: 16

## 2023-10-10 ASSESSMENT — CORNEAL EDEMA CLINICAL DESCRIPTION: OD_CORNEALEDEMA: T

## 2023-10-10 ASSESSMENT — REFRACTION_AUTOREFRACTION
OS_CYLINDER: +1.50
OD_SPHERE: -0.75
OS_SPHERE: -0.75
OD_CYLINDER: +1.00
OD_AXIS: 135
OS_AXIS: 135

## 2023-10-10 ASSESSMENT — VISUAL ACUITY
OD_BCVA: 20/40-2
OS_BCVA: 20/40-1

## 2023-10-10 ASSESSMENT — CONFRONTATIONAL VISUAL FIELD TEST (CVF)
OD_FINDINGS: FULL
OS_FINDINGS: FULL

## 2023-10-10 ASSESSMENT — CORNEAL EDEMA - FOLDS/STRIAE: OD_FOLDSSTRIAE: 1+

## 2023-10-10 ASSESSMENT — LID POSITION - ECTROPION: OS_ECTROPION: LLL 1+

## 2023-10-10 ASSESSMENT — KERATOMETRY: METHOD_AUTO_MANUAL: MANUAL

## 2023-11-21 ENCOUNTER — OFFICE (OUTPATIENT)
Dept: URBAN - METROPOLITAN AREA CLINIC 29 | Facility: CLINIC | Age: 83
Setting detail: OPHTHALMOLOGY
End: 2023-11-21
Payer: MEDICARE

## 2023-11-21 DIAGNOSIS — H16.223: ICD-10-CM

## 2023-11-21 DIAGNOSIS — H40.1113: ICD-10-CM

## 2023-11-21 DIAGNOSIS — H01.004: ICD-10-CM

## 2023-11-21 DIAGNOSIS — H01.005: ICD-10-CM

## 2023-11-21 DIAGNOSIS — H02.055: ICD-10-CM

## 2023-11-21 DIAGNOSIS — H01.001: ICD-10-CM

## 2023-11-21 DIAGNOSIS — H40.1123: ICD-10-CM

## 2023-11-21 DIAGNOSIS — H01.002: ICD-10-CM

## 2023-11-21 DIAGNOSIS — Z96.1: ICD-10-CM

## 2023-11-21 PROCEDURE — 92020 GONIOSCOPY: CPT | Performed by: OPHTHALMOLOGY

## 2023-11-21 PROCEDURE — 99213 OFFICE O/P EST LOW 20 MIN: CPT | Performed by: OPHTHALMOLOGY

## 2023-11-21 PROCEDURE — 92083 EXTENDED VISUAL FIELD XM: CPT | Performed by: OPHTHALMOLOGY

## 2023-11-21 PROCEDURE — 92133 CPTRZD OPH DX IMG PST SGM ON: CPT | Performed by: OPHTHALMOLOGY

## 2023-11-21 ASSESSMENT — REFRACTION_MANIFEST
OD_VA1: 20/30
OD_ADD: +3.00
OD_CYLINDER: +0.75
OS_CYLINDER: +1.25
OS_ADD: +3.00
OS_AXIS: 130
OD_AXIS: 120
OS_CYLINDER: +1.25
OS_SPHERE: -0.50
OD_CYLINDER: +0.75
OS_AXIS: 175
OD_AXIS: 145
OD_VA1: 20-25+1
OS_VA1: 20/40-2
OS_VA1: 20/25-2
OD_SPHERE: -0.50
OD_SPHERE: -0.75
OS_SPHERE: -0.75

## 2023-11-21 ASSESSMENT — REFRACTION_AUTOREFRACTION
OD_AXIS: 135
OD_CYLINDER: +1.00
OS_CYLINDER: +1.50
OS_SPHERE: -0.75
OD_SPHERE: -0.75
OS_AXIS: 135

## 2023-11-21 ASSESSMENT — CONFRONTATIONAL VISUAL FIELD TEST (CVF)
OS_FINDINGS: FULL
OD_FINDINGS: FULL

## 2023-11-21 ASSESSMENT — LID POSITION - ECTROPION: OS_ECTROPION: ABSENT

## 2023-11-21 ASSESSMENT — LID EXAM ASSESSMENTS
OD_BLEPHARITIS: RLL RUL 2+
OS_BLEPHARITIS: LLL LUL 2+

## 2023-11-21 ASSESSMENT — SPHEQUIV_DERIVED
OD_SPHEQUIV: -0.375
OS_SPHEQUIV: -0.125
OD_SPHEQUIV: -0.125
OS_SPHEQUIV: 0
OD_SPHEQUIV: -0.25
OS_SPHEQUIV: 0.125

## 2023-11-22 PROBLEM — H01.002 BLEPHARITIS; RIGHT UPPER LID, RIGHT LOWER LID, LEFT UPPER LID, LEFT LOWER LID: Status: ACTIVE | Noted: 2023-11-21

## 2023-11-22 PROBLEM — H01.004 BLEPHARITIS; RIGHT UPPER LID, RIGHT LOWER LID, LEFT UPPER LID, LEFT LOWER LID: Status: ACTIVE | Noted: 2023-11-21

## 2023-11-22 PROBLEM — H01.005 BLEPHARITIS; RIGHT UPPER LID, RIGHT LOWER LID, LEFT UPPER LID, LEFT LOWER LID: Status: ACTIVE | Noted: 2023-11-21

## 2023-11-22 PROBLEM — H01.001 BLEPHARITIS; RIGHT UPPER LID, RIGHT LOWER LID, LEFT UPPER LID, LEFT LOWER LID: Status: ACTIVE | Noted: 2023-11-21

## 2024-03-06 ENCOUNTER — OFFICE (OUTPATIENT)
Dept: URBAN - METROPOLITAN AREA CLINIC 30 | Facility: CLINIC | Age: 84
Setting detail: OPHTHALMOLOGY
End: 2024-03-06
Payer: MEDICARE

## 2024-03-06 DIAGNOSIS — H40.1123: ICD-10-CM

## 2024-03-06 PROCEDURE — 66030 INJECTION TREATMENT OF EYE: CPT | Mod: LT | Performed by: OPHTHALMOLOGY

## 2024-03-06 ASSESSMENT — LID EXAM ASSESSMENTS
OD_BLEPHARITIS: RLL RUL 2+
OS_BLEPHARITIS: LLL LUL 2+

## 2024-03-06 ASSESSMENT — LID POSITION - ECTROPION: OS_ECTROPION: ABSENT

## 2024-03-07 ASSESSMENT — SPHEQUIV_DERIVED
OD_SPHEQUIV: -0.125
OD_SPHEQUIV: -0.375
OS_SPHEQUIV: 0.125
OS_SPHEQUIV: -0.125

## 2024-03-07 ASSESSMENT — REFRACTION_MANIFEST
OS_VA1: 20/25-2
OS_AXIS: 130
OD_CYLINDER: +0.75
OD_ADD: +3.00
OS_SPHERE: -0.75
OS_CYLINDER: +1.25
OS_SPHERE: -0.50
OS_AXIS: 175
OD_SPHERE: -0.50
OD_CYLINDER: +0.75
OS_VA1: 20/40-2
OS_CYLINDER: +1.25
OD_SPHERE: -0.75
OS_ADD: +3.00
OD_AXIS: 145
OD_AXIS: 120
OD_VA1: 20-25+1
OD_VA1: 20/30

## 2024-05-01 ENCOUNTER — OFFICE (OUTPATIENT)
Dept: URBAN - METROPOLITAN AREA CLINIC 30 | Facility: CLINIC | Age: 84
Setting detail: OPHTHALMOLOGY
End: 2024-05-01
Payer: MEDICARE

## 2024-05-01 DIAGNOSIS — H40.1113: ICD-10-CM

## 2024-05-01 PROCEDURE — 66030 INJECTION TREATMENT OF EYE: CPT | Mod: RT | Performed by: OPHTHALMOLOGY

## 2024-05-01 ASSESSMENT — LID EXAM ASSESSMENTS
OD_BLEPHARITIS: RLL RUL 2+
OS_BLEPHARITIS: LLL LUL 2+

## 2024-05-01 ASSESSMENT — CONFRONTATIONAL VISUAL FIELD TEST (CVF)
OS_FINDINGS: FULL
OD_FINDINGS: FULL

## 2024-05-01 ASSESSMENT — LID POSITION - ECTROPION: OS_ECTROPION: ABSENT

## 2024-06-05 ENCOUNTER — OFFICE (OUTPATIENT)
Dept: URBAN - METROPOLITAN AREA CLINIC 30 | Facility: CLINIC | Age: 84
Setting detail: OPHTHALMOLOGY
End: 2024-06-05
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
## 2024-06-05 PROCEDURE — NO SHOW FE NO SHOW FEE: Performed by: OPHTHALMOLOGY

## 2024-08-07 ENCOUNTER — OFFICE (OUTPATIENT)
Dept: URBAN - METROPOLITAN AREA CLINIC 30 | Facility: CLINIC | Age: 84
Setting detail: OPHTHALMOLOGY
End: 2024-08-07
Payer: MEDICARE

## 2024-08-07 DIAGNOSIS — Z96.1: ICD-10-CM

## 2024-08-07 DIAGNOSIS — H01.001: ICD-10-CM

## 2024-08-07 DIAGNOSIS — H02.055: ICD-10-CM

## 2024-08-07 DIAGNOSIS — H16.223: ICD-10-CM

## 2024-08-07 DIAGNOSIS — H01.004: ICD-10-CM

## 2024-08-07 DIAGNOSIS — H01.002: ICD-10-CM

## 2024-08-07 DIAGNOSIS — H40.1113: ICD-10-CM

## 2024-08-07 DIAGNOSIS — H01.005: ICD-10-CM

## 2024-08-07 DIAGNOSIS — H21.41: ICD-10-CM

## 2024-08-07 DIAGNOSIS — H40.1123: ICD-10-CM

## 2024-08-07 PROCEDURE — 99214 OFFICE O/P EST MOD 30 MIN: CPT | Mod: 57 | Performed by: OPHTHALMOLOGY

## 2024-08-07 PROCEDURE — 65860 SEVERING ADS ANT SGM LASER: CPT | Mod: RT,51 | Performed by: OPHTHALMOLOGY

## 2024-08-07 PROCEDURE — 67515 INJECT/TREAT EYE SOCKET: CPT | Mod: RT | Performed by: OPHTHALMOLOGY

## 2024-08-07 PROCEDURE — 92250 FUNDUS PHOTOGRAPHY W/I&R: CPT | Performed by: OPHTHALMOLOGY

## 2024-08-07 ASSESSMENT — LID EXAM ASSESSMENTS
OD_BLEPHARITIS: RLL RUL 2+
OS_BLEPHARITIS: LLL LUL 2+

## 2024-08-07 ASSESSMENT — LID POSITION - ECTROPION: OS_ECTROPION: ABSENT

## 2024-08-07 ASSESSMENT — CONFRONTATIONAL VISUAL FIELD TEST (CVF)
OD_FINDINGS: FULL
OS_FINDINGS: FULL

## 2024-09-25 ENCOUNTER — OFFICE (OUTPATIENT)
Facility: LOCATION | Age: 84
Setting detail: OPHTHALMOLOGY
End: 2024-09-25
Payer: MEDICARE

## 2024-09-25 ENCOUNTER — RX ONLY (RX ONLY)
Age: 84
End: 2024-09-25

## 2024-09-25 DIAGNOSIS — H40.1123: ICD-10-CM

## 2024-09-25 DIAGNOSIS — H01.002: ICD-10-CM

## 2024-09-25 DIAGNOSIS — H01.001: ICD-10-CM

## 2024-09-25 DIAGNOSIS — H40.1113: ICD-10-CM

## 2024-09-25 DIAGNOSIS — Z96.1: ICD-10-CM

## 2024-09-25 DIAGNOSIS — H01.004: ICD-10-CM

## 2024-09-25 DIAGNOSIS — H16.223: ICD-10-CM

## 2024-09-25 DIAGNOSIS — H01.005: ICD-10-CM

## 2024-09-25 DIAGNOSIS — H02.055: ICD-10-CM

## 2024-09-25 PROCEDURE — 99213 OFFICE O/P EST LOW 20 MIN: CPT | Mod: 24 | Performed by: OPHTHALMOLOGY

## 2024-09-25 ASSESSMENT — LID POSITION - ECTROPION: OS_ECTROPION: ABSENT

## 2024-09-25 ASSESSMENT — CONFRONTATIONAL VISUAL FIELD TEST (CVF)
OS_FINDINGS: FULL
OD_FINDINGS: FULL

## 2024-09-25 ASSESSMENT — LID EXAM ASSESSMENTS
OD_BLEPHARITIS: RLL RUL 2+
OS_BLEPHARITIS: LLL LUL 2+

## 2024-11-27 ENCOUNTER — OFFICE (OUTPATIENT)
Facility: LOCATION | Age: 84
Setting detail: OPHTHALMOLOGY
End: 2024-11-27
Payer: MEDICARE

## 2024-11-27 DIAGNOSIS — H40.1133: ICD-10-CM

## 2024-11-27 DIAGNOSIS — H01.002: ICD-10-CM

## 2024-11-27 DIAGNOSIS — H01.001: ICD-10-CM

## 2024-11-27 DIAGNOSIS — H01.005: ICD-10-CM

## 2024-11-27 DIAGNOSIS — H02.055: ICD-10-CM

## 2024-11-27 DIAGNOSIS — H01.004: ICD-10-CM

## 2024-11-27 DIAGNOSIS — Z96.1: ICD-10-CM

## 2024-11-27 DIAGNOSIS — H16.223: ICD-10-CM

## 2024-11-27 DIAGNOSIS — H21.41: ICD-10-CM

## 2024-11-27 PROCEDURE — 92083 EXTENDED VISUAL FIELD XM: CPT | Performed by: OPHTHALMOLOGY

## 2024-11-27 PROCEDURE — 92133 CPTRZD OPH DX IMG PST SGM ON: CPT | Performed by: OPHTHALMOLOGY

## 2024-11-27 PROCEDURE — 68761 CLOSE TEAR DUCT OPENING: CPT | Mod: 50 | Performed by: OPHTHALMOLOGY

## 2024-11-27 PROCEDURE — 99214 OFFICE O/P EST MOD 30 MIN: CPT | Mod: 25 | Performed by: OPHTHALMOLOGY

## 2024-11-27 ASSESSMENT — CONFRONTATIONAL VISUAL FIELD TEST (CVF)
OS_FINDINGS: FULL
OD_FINDINGS: FULL

## 2024-11-27 ASSESSMENT — REFRACTION_MANIFEST
OD_ADD: +3.00
OS_CYLINDER: +1.25
OD_CYLINDER: +0.75
OS_AXIS: 130
OD_VA1: 20-25+1
OS_AXIS: 175
OD_AXIS: 145
OS_SPHERE: -0.75
OS_CYLINDER: +1.25
OD_VA1: 20/30
OD_SPHERE: -0.50
OS_ADD: +3.00
OD_CYLINDER: +0.75
OS_SPHERE: -0.50
OS_VA1: 20/25-2
OS_VA1: 20/40-2
OD_AXIS: 120
OD_SPHERE: -0.75

## 2024-11-27 ASSESSMENT — REFRACTION_CURRENTRX
OS_OVR_VA: 20/
OS_SPHERE: +2.75
OD_OVR_VA: 20/
OD_SPHERE: +2.75

## 2024-11-27 ASSESSMENT — REFRACTION_AUTOREFRACTION
OD_CYLINDER: +1.00
OS_SPHERE: -0.75
OS_CYLINDER: +1.50
OS_AXIS: 135
OD_AXIS: 135
OD_SPHERE: -0.75

## 2024-11-27 ASSESSMENT — LID EXAM ASSESSMENTS
OS_BLEPHARITIS: LLL LUL 2+
OD_BLEPHARITIS: RLL RUL 2+

## 2024-11-27 ASSESSMENT — TONOMETRY
OD_IOP_MMHG: 14
OS_IOP_MMHG: 17

## 2024-11-27 ASSESSMENT — VISUAL ACUITY
OS_BCVA: 20/40
OD_BCVA: 20/25-2

## 2024-11-27 ASSESSMENT — KERATOMETRY: METHOD_AUTO_MANUAL: MANUAL

## 2024-11-27 ASSESSMENT — LID POSITION - ECTROPION: OS_ECTROPION: ABSENT

## 2025-03-19 ENCOUNTER — OFFICE (OUTPATIENT)
Facility: LOCATION | Age: 85
Setting detail: OPHTHALMOLOGY
End: 2025-03-19
Payer: MEDICARE

## 2025-03-19 DIAGNOSIS — H40.1113: ICD-10-CM

## 2025-03-19 DIAGNOSIS — H40.1123: ICD-10-CM

## 2025-03-19 PROCEDURE — 92020 GONIOSCOPY: CPT | Performed by: OPHTHALMOLOGY

## 2025-03-19 PROCEDURE — 92014 COMPRE OPH EXAM EST PT 1/>: CPT | Performed by: OPHTHALMOLOGY

## 2025-03-19 ASSESSMENT — REFRACTION_MANIFEST
OD_VA1: 20/30
OS_ADD: +3.00
OD_AXIS: 145
OS_VA1: 20/25-2
OD_SPHERE: -0.75
OD_VA1: 20-25+1
OS_SPHERE: -0.50
OD_CYLINDER: +0.75
OS_AXIS: 175
OS_AXIS: 130
OS_CYLINDER: +1.25
OD_CYLINDER: +0.75
OS_SPHERE: -0.75
OD_SPHERE: -0.50
OD_ADD: +3.00
OS_CYLINDER: +1.25
OS_VA1: 20/40-2
OD_AXIS: 120

## 2025-03-19 ASSESSMENT — VISUAL ACUITY
OD_BCVA: 20/25-2
OS_BCVA: 20/40+1

## 2025-03-19 ASSESSMENT — LID POSITION - ECTROPION: OS_ECTROPION: ABSENT

## 2025-03-19 ASSESSMENT — REFRACTION_CURRENTRX
OD_SPHERE: +2.75
OS_OVR_VA: 20/
OD_OVR_VA: 20/
OS_SPHERE: +2.75

## 2025-03-19 ASSESSMENT — REFRACTION_AUTOREFRACTION
OD_SPHERE: -0.75
OS_CYLINDER: +1.50
OS_AXIS: 135
OD_AXIS: 135
OS_SPHERE: -0.75
OD_CYLINDER: +1.00

## 2025-03-19 ASSESSMENT — LID EXAM ASSESSMENTS
OD_BLEPHARITIS: RLL RUL 2+
OS_BLEPHARITIS: LLL LUL 2+

## 2025-03-19 ASSESSMENT — CONFRONTATIONAL VISUAL FIELD TEST (CVF)
OS_FINDINGS: FULL
OD_FINDINGS: FULL

## 2025-03-19 ASSESSMENT — TONOMETRY
OS_IOP_MMHG: 17
OD_IOP_MMHG: 16

## 2025-03-19 ASSESSMENT — KERATOMETRY: METHOD_AUTO_MANUAL: MANUAL
